# Patient Record
Sex: FEMALE | Race: WHITE | Employment: PART TIME | URBAN - METROPOLITAN AREA
[De-identification: names, ages, dates, MRNs, and addresses within clinical notes are randomized per-mention and may not be internally consistent; named-entity substitution may affect disease eponyms.]

---

## 2017-04-04 ENCOUNTER — OFFICE VISIT (OUTPATIENT)
Dept: PHYSICAL THERAPY | Age: 57
End: 2017-04-04
Attending: PHYSICIAN ASSISTANT
Payer: COMMERCIAL

## 2017-04-04 PROCEDURE — 97163 PT EVAL HIGH COMPLEX 45 MIN: CPT

## 2017-04-04 PROCEDURE — 97110 THERAPEUTIC EXERCISES: CPT

## 2017-04-04 NOTE — PROGRESS NOTES
LOWER EXTREMITY EVALUATION:   Referring Physician: Dr. Chevy Redmond  Diagnosis: gait dysfunction S/P L ankle ORIF 11/18/2016 due to trimalleolar fracture  Date of Service: 4/4/2017     PATIENT SUMMARY   Leandra Dalton is a 62year old y/o female who presents to increase functional use of LLE. See goals below. Precautions:  None  OBJECTIVE:   Gait: moderate antalgic gait favoring L pain. Noted increased L toe out, minimal heel strike and push off during stance phase of gait.    Palpation: tenderness B malleolar progress achieved in PT. Frequency / Duration: Patient will be seen for 2 x/week or a total of 12 visits over a 90 day period. Treatment will include: Manual Therapy; Therapeutic Exercises;  Neuromuscular Re-education; Gait Training; DL to SL strengthen

## 2017-04-06 ENCOUNTER — OFFICE VISIT (OUTPATIENT)
Dept: PHYSICAL THERAPY | Age: 57
End: 2017-04-06
Attending: PHYSICIAN ASSISTANT
Payer: COMMERCIAL

## 2017-04-06 PROCEDURE — 97110 THERAPEUTIC EXERCISES: CPT

## 2017-04-06 NOTE — PROGRESS NOTES
Dx: gait dysfunction S/P L ankle ORIF 11/18/2016 due to trimalleolar fracture          Authorized # of Visits:  12         Next MD visit: none scheduled  Fall Risk: standard         Precautions: n/a           Subjective: patient states that she is trying t exercises to increase strength and improve gait, gait training with cues to increase stance phase, improve heel strike and push off. Charges:  Thera Ex 3 ( 40 min)        Total Timed Treatment: 46 min  Total Treatment Time: 46 min

## 2017-04-10 ENCOUNTER — APPOINTMENT (OUTPATIENT)
Dept: PHYSICAL THERAPY | Age: 57
End: 2017-04-10
Attending: PHYSICIAN ASSISTANT
Payer: COMMERCIAL

## 2017-04-12 ENCOUNTER — OFFICE VISIT (OUTPATIENT)
Dept: PHYSICAL THERAPY | Age: 57
End: 2017-04-12
Attending: PHYSICIAN ASSISTANT
Payer: COMMERCIAL

## 2017-04-12 PROCEDURE — 97110 THERAPEUTIC EXERCISES: CPT

## 2017-04-12 NOTE — PROGRESS NOTES
Dx: gait dysfunction S/P L ankle ORIF 11/18/2016 due to trimalleolar fracture          Authorized # of Visits:  12         Next MD visit: none scheduled  Fall Risk: standard         Precautions: n/a           Subjective: patient states that she was in a lo Tandem walk with min A and with UE support 2 lengths of parallel bars  Tandem walk with min A and with UE support 2 lengths of parallel bars         Shuttle DL squats L 4 30 reps Shuttle DL squats increased to L5 30 reps        Ankle mobilization all curly

## 2017-04-17 ENCOUNTER — APPOINTMENT (OUTPATIENT)
Dept: PHYSICAL THERAPY | Age: 57
End: 2017-04-17
Payer: COMMERCIAL

## 2017-04-19 ENCOUNTER — OFFICE VISIT (OUTPATIENT)
Dept: PHYSICAL THERAPY | Age: 57
End: 2017-04-19
Attending: PHYSICIAN ASSISTANT
Payer: COMMERCIAL

## 2017-04-19 PROCEDURE — 97110 THERAPEUTIC EXERCISES: CPT

## 2017-04-19 NOTE — PROGRESS NOTES
Dx: gait dysfunction S/P L ankle ORIF 11/18/2016 due to trimalleolar fracture          Authorized # of Visits:  12         Next MD visit: none scheduled  Fall Risk: standard         Precautions: n/a           Subjective: patient states that her ankle is peter Lateral walk in parallel bars 5X Lateral walk in parallel bars 5X CP 10 min        Tandem walk with min A and with UE support 2 lengths of parallel bars  Tandem walk with min A and with UE support 2 lengths of parallel bars         Shuttle DL squats L

## 2017-04-24 ENCOUNTER — OFFICE VISIT (OUTPATIENT)
Dept: PHYSICAL THERAPY | Age: 57
End: 2017-04-24
Attending: PHYSICIAN ASSISTANT
Payer: COMMERCIAL

## 2017-04-24 PROCEDURE — 97110 THERAPEUTIC EXERCISES: CPT

## 2017-04-24 PROCEDURE — 97140 MANUAL THERAPY 1/> REGIONS: CPT

## 2017-04-24 NOTE — PROGRESS NOTES
Dx: gait dysfunction S/P L ankle ORIF 11/18/2016 due to trimalleolar fracture          Authorized # of Visits:  12         Next MD visit: none scheduled  Fall Risk: standard         Precautions: n/a           Subjective: patient states that her ankle is so gastroc stretching on incline 5 sec hold X 10 gastroc stretching on incline 5 sec hold X 10 gastroc stretching on incline 5 sec hold X 5 Anterior step up and over 2\" 10X      Tandem balance R and L 10 sec X 10 Tandem balance R and L 10 sec X 10 Ankle mo

## 2017-04-26 ENCOUNTER — APPOINTMENT (OUTPATIENT)
Dept: PHYSICAL THERAPY | Age: 57
End: 2017-04-26
Payer: COMMERCIAL

## 2017-05-02 ENCOUNTER — APPOINTMENT (OUTPATIENT)
Dept: PHYSICAL THERAPY | Age: 57
End: 2017-05-02
Attending: PHYSICIAN ASSISTANT
Payer: COMMERCIAL

## 2017-05-04 ENCOUNTER — APPOINTMENT (OUTPATIENT)
Dept: PHYSICAL THERAPY | Age: 57
End: 2017-05-04
Attending: PHYSICIAN ASSISTANT
Payer: COMMERCIAL

## 2017-05-04 ENCOUNTER — TELEPHONE (OUTPATIENT)
Dept: PHYSICAL THERAPY | Age: 57
End: 2017-05-04

## 2017-05-09 ENCOUNTER — APPOINTMENT (OUTPATIENT)
Dept: PHYSICAL THERAPY | Age: 57
End: 2017-05-09
Attending: PHYSICIAN ASSISTANT
Payer: COMMERCIAL

## 2017-05-11 ENCOUNTER — APPOINTMENT (OUTPATIENT)
Dept: PHYSICAL THERAPY | Age: 57
End: 2017-05-11
Attending: PHYSICIAN ASSISTANT
Payer: COMMERCIAL

## 2017-05-15 ENCOUNTER — APPOINTMENT (OUTPATIENT)
Dept: PHYSICAL THERAPY | Age: 57
End: 2017-05-15
Attending: PHYSICIAN ASSISTANT
Payer: COMMERCIAL

## 2017-05-17 ENCOUNTER — APPOINTMENT (OUTPATIENT)
Dept: PHYSICAL THERAPY | Age: 57
End: 2017-05-17
Attending: PHYSICIAN ASSISTANT
Payer: COMMERCIAL

## 2017-05-23 ENCOUNTER — APPOINTMENT (OUTPATIENT)
Dept: PHYSICAL THERAPY | Age: 57
End: 2017-05-23
Attending: PHYSICIAN ASSISTANT
Payer: COMMERCIAL

## 2017-08-26 ENCOUNTER — HOSPITAL ENCOUNTER (OUTPATIENT)
Dept: CT IMAGING | Facility: HOSPITAL | Age: 57
Discharge: HOME | End: 2017-08-26
Attending: FAMILY MEDICINE
Payer: COMMERCIAL

## 2017-08-26 DIAGNOSIS — K21.9 GASTROESOPHAGEAL REFLUX DISEASE: ICD-10-CM

## 2017-08-26 PROCEDURE — 71260 CT THORAX DX C+: CPT | Performed by: FAMILY MEDICINE

## 2017-08-26 PROCEDURE — 74177 CT ABD & PELVIS W/CONTRAST: CPT | Performed by: FAMILY MEDICINE

## 2017-11-28 ENCOUNTER — HOSPITAL ENCOUNTER (OUTPATIENT)
Dept: MAMMOGRAPHY | Age: 57
Discharge: HOME | End: 2017-11-28
Attending: FAMILY MEDICINE
Payer: COMMERCIAL

## 2017-11-28 DIAGNOSIS — Z12.39 ENCOUNTER FOR SCREENING FOR MALIGNANT NEOPLASM OF BREAST: ICD-10-CM

## 2017-11-28 PROCEDURE — 77067 SCR MAMMO BI INCL CAD: CPT | Performed by: FAMILY MEDICINE

## 2018-08-20 ENCOUNTER — LAB REQUISITION (OUTPATIENT)
Dept: LAB | Facility: HOSPITAL | Age: 58
End: 2018-08-20
Payer: COMMERCIAL

## 2018-08-20 DIAGNOSIS — R19.7 DIARRHEA: ICD-10-CM

## 2018-08-20 DIAGNOSIS — R12 HEARTBURN: ICD-10-CM

## 2018-08-20 DIAGNOSIS — K29.30 CHRONIC SUPERFICIAL GASTRITIS WITHOUT BLEEDING: ICD-10-CM

## 2018-08-20 DIAGNOSIS — R14.1 GAS PAIN: ICD-10-CM

## 2018-08-20 PROCEDURE — 88305 TISSUE EXAM BY PATHOLOGIST: CPT | Performed by: INTERNAL MEDICINE

## 2019-02-12 ENCOUNTER — HOSPITAL ENCOUNTER (OUTPATIENT)
Dept: MAMMOGRAPHY | Age: 59
Discharge: HOME | End: 2019-02-12
Attending: FAMILY MEDICINE
Payer: COMMERCIAL

## 2019-02-12 ENCOUNTER — HOSPITAL ENCOUNTER (OUTPATIENT)
Dept: CT IMAGING | Age: 59
Discharge: HOME | End: 2019-02-12
Attending: FAMILY MEDICINE
Payer: COMMERCIAL

## 2019-02-12 DIAGNOSIS — R91.1 LUNG NODULE: ICD-10-CM

## 2019-02-12 DIAGNOSIS — Z12.31 ENCOUNTER FOR SCREENING MAMMOGRAM FOR MALIGNANT NEOPLASM OF BREAST: ICD-10-CM

## 2019-02-12 DIAGNOSIS — E27.8 ADRENAL MASS (HCC): ICD-10-CM

## 2019-02-12 DIAGNOSIS — R93.5 ABNORMAL CT SCAN, PELVIS: ICD-10-CM

## 2019-02-12 LAB — CREAT SERPL-MCNC: 1 MG/DL (ref 0.55–1.02)

## 2019-02-12 PROCEDURE — 71260 CT THORAX DX C+: CPT | Performed by: FAMILY MEDICINE

## 2019-02-12 PROCEDURE — 82565 ASSAY OF CREATININE: CPT

## 2019-02-12 PROCEDURE — 74177 CT ABD & PELVIS W/CONTRAST: CPT | Performed by: FAMILY MEDICINE

## 2019-02-12 PROCEDURE — 77063 BREAST TOMOSYNTHESIS BI: CPT | Performed by: FAMILY MEDICINE

## 2019-02-12 PROCEDURE — 77067 SCR MAMMO BI INCL CAD: CPT | Performed by: FAMILY MEDICINE

## 2019-02-20 ENCOUNTER — APPOINTMENT (OUTPATIENT)
Dept: LAB | Age: 59
End: 2019-02-20
Attending: DERMATOLOGY
Payer: COMMERCIAL

## 2019-02-20 DIAGNOSIS — C44.01 BCC (BASAL CELL CARCINOMA), LIP: ICD-10-CM

## 2019-02-20 PROCEDURE — 88305 TISSUE EXAM BY PATHOLOGIST: CPT

## 2019-03-13 ENCOUNTER — APPOINTMENT (OUTPATIENT)
Dept: LAB | Age: 59
End: 2019-03-13
Attending: DERMATOLOGY
Payer: COMMERCIAL

## 2019-03-13 DIAGNOSIS — L72.11 PILAR CYST: ICD-10-CM

## 2019-03-13 DIAGNOSIS — R52 TENDERNESS: ICD-10-CM

## 2019-03-13 PROCEDURE — 88305 TISSUE EXAM BY PATHOLOGIST: CPT

## 2019-03-13 PROCEDURE — 88304 TISSUE EXAM BY PATHOLOGIST: CPT

## 2020-01-22 ENCOUNTER — HOSPITAL ENCOUNTER (OUTPATIENT)
Dept: CV DIAGNOSTICS | Facility: HOSPITAL | Age: 60
Discharge: HOME | End: 2020-01-22
Attending: FAMILY MEDICINE
Payer: COMMERCIAL

## 2020-01-22 DIAGNOSIS — R94.31 ABNORMAL EKG: ICD-10-CM

## 2020-01-22 PROCEDURE — 93226 XTRNL ECG REC<48 HR SCAN A/R: CPT | Performed by: FAMILY MEDICINE

## 2020-01-22 PROCEDURE — 93225 XTRNL ECG REC<48 HRS REC: CPT | Performed by: FAMILY MEDICINE

## 2020-01-22 PROCEDURE — 93227 XTRNL ECG REC<48 HR R&I: CPT | Performed by: FAMILY MEDICINE

## 2020-01-22 PROCEDURE — 93306 TTE W/DOPPLER COMPLETE: CPT | Performed by: FAMILY MEDICINE

## 2020-03-11 ENCOUNTER — HOSPITAL ENCOUNTER (OUTPATIENT)
Dept: CT IMAGING | Age: 60
Discharge: HOME | End: 2020-03-11
Attending: FAMILY MEDICINE
Payer: COMMERCIAL

## 2020-03-11 DIAGNOSIS — R91.1 LUNG NODULE: ICD-10-CM

## 2020-03-11 LAB — CREAT BLD-MCNC: 0.7 MG/DL (ref 0.55–1.02)

## 2020-03-11 PROCEDURE — 82565 ASSAY OF CREATININE: CPT

## 2020-03-11 PROCEDURE — 71260 CT THORAX DX C+: CPT | Performed by: FAMILY MEDICINE

## 2020-03-11 PROCEDURE — 74177 CT ABD & PELVIS W/CONTRAST: CPT | Performed by: FAMILY MEDICINE

## 2020-05-08 ENCOUNTER — HOSPITAL ENCOUNTER (OUTPATIENT)
Dept: MAMMOGRAPHY | Facility: HOSPITAL | Age: 60
Discharge: HOME | End: 2020-05-08
Attending: FAMILY MEDICINE
Payer: COMMERCIAL

## 2020-05-08 DIAGNOSIS — Z12.31 ENCOUNTER FOR SCREENING MAMMOGRAM FOR MALIGNANT NEOPLASM OF BREAST: ICD-10-CM

## 2020-05-08 PROCEDURE — 77067 SCR MAMMO BI INCL CAD: CPT | Performed by: FAMILY MEDICINE

## 2020-05-08 PROCEDURE — 77063 BREAST TOMOSYNTHESIS BI: CPT | Performed by: FAMILY MEDICINE

## 2020-05-21 ENCOUNTER — HOSPITAL ENCOUNTER (OUTPATIENT)
Dept: MAMMOGRAPHY | Facility: HOSPITAL | Age: 60
Discharge: HOME | End: 2020-05-21
Attending: FAMILY MEDICINE
Payer: COMMERCIAL

## 2020-05-21 DIAGNOSIS — R92.2 INCONCLUSIVE MAMMOGRAM: ICD-10-CM

## 2020-05-21 PROCEDURE — 76642 ULTRASOUND BREAST LIMITED: CPT | Performed by: FAMILY MEDICINE

## 2022-02-03 ENCOUNTER — ORDER TRANSCRIPTION (OUTPATIENT)
Dept: SLEEP CENTER | Age: 62
End: 2022-02-03

## 2022-02-07 ENCOUNTER — HOSPITAL ENCOUNTER (OUTPATIENT)
Dept: CV DIAGNOSTICS | Facility: HOSPITAL | Age: 62
Discharge: HOME | End: 2022-02-07
Attending: FAMILY MEDICINE
Payer: COMMERCIAL

## 2022-02-07 DIAGNOSIS — I48.91 ATRIAL FIBRILLATION, UNSPECIFIED TYPE (HCC): ICD-10-CM

## 2022-02-07 DIAGNOSIS — R00.2 PALPITATIONS: ICD-10-CM

## 2022-02-07 PROCEDURE — 93306 TTE W/DOPPLER COMPLETE: CPT | Performed by: FAMILY MEDICINE

## 2022-02-11 ENCOUNTER — HOSPITAL ENCOUNTER (OUTPATIENT)
Dept: MAMMOGRAPHY | Age: 62
Discharge: HOME | End: 2022-02-11
Attending: FAMILY MEDICINE
Payer: COMMERCIAL

## 2022-02-11 ENCOUNTER — HOSPITAL ENCOUNTER (OUTPATIENT)
Dept: CT IMAGING | Age: 62
Discharge: HOME | End: 2022-02-11
Attending: FAMILY MEDICINE
Payer: COMMERCIAL

## 2022-02-11 DIAGNOSIS — N60.09: ICD-10-CM

## 2022-02-11 DIAGNOSIS — R19.7 DIARRHEA, UNSPECIFIED TYPE: ICD-10-CM

## 2022-02-11 DIAGNOSIS — R10.84 ABDOMINAL PAIN, GENERALIZED: ICD-10-CM

## 2022-02-11 DIAGNOSIS — Z12.31 ENCOUNTER FOR SCREENING MAMMOGRAM FOR MALIGNANT NEOPLASM OF BREAST: ICD-10-CM

## 2022-02-11 LAB — CREAT BLD-MCNC: 0.8 MG/DL

## 2022-02-11 PROCEDURE — 74177 CT ABD & PELVIS W/CONTRAST: CPT | Performed by: FAMILY MEDICINE

## 2022-02-11 PROCEDURE — 82565 ASSAY OF CREATININE: CPT

## 2022-02-11 PROCEDURE — 77067 SCR MAMMO BI INCL CAD: CPT | Performed by: FAMILY MEDICINE

## 2022-02-11 PROCEDURE — 77063 BREAST TOMOSYNTHESIS BI: CPT | Performed by: FAMILY MEDICINE

## 2022-02-25 ENCOUNTER — HOSPITAL ENCOUNTER (OUTPATIENT)
Dept: MAMMOGRAPHY | Facility: HOSPITAL | Age: 62
Discharge: HOME | End: 2022-02-25
Attending: FAMILY MEDICINE
Payer: COMMERCIAL

## 2022-02-25 DIAGNOSIS — R92.2 INCONCLUSIVE MAMMOGRAPHY: ICD-10-CM

## 2022-02-25 PROCEDURE — 77061 BREAST TOMOSYNTHESIS UNI: CPT | Performed by: FAMILY MEDICINE

## 2022-02-25 PROCEDURE — 77065 DX MAMMO INCL CAD UNI: CPT | Performed by: FAMILY MEDICINE

## 2022-02-25 PROCEDURE — 76642 ULTRASOUND BREAST LIMITED: CPT | Performed by: FAMILY MEDICINE

## 2022-03-12 ENCOUNTER — OFFICE VISIT (OUTPATIENT)
Dept: SLEEP CENTER | Age: 62
End: 2022-03-12
Attending: INTERNAL MEDICINE
Payer: COMMERCIAL

## 2022-03-12 DIAGNOSIS — I48.91 A-FIB (HCC): ICD-10-CM

## 2022-03-12 DIAGNOSIS — R00.2 PALPITATIONS: ICD-10-CM

## 2022-03-12 PROCEDURE — 95810 POLYSOM 6/> YRS 4/> PARAM: CPT

## 2022-03-18 ENCOUNTER — HOSPITAL ENCOUNTER (OUTPATIENT)
Dept: CT IMAGING | Facility: HOSPITAL | Age: 62
Discharge: HOME | End: 2022-03-18
Attending: FAMILY MEDICINE
Payer: COMMERCIAL

## 2022-03-18 DIAGNOSIS — R91.1 LUNG NODULE: ICD-10-CM

## 2022-03-18 PROCEDURE — 71250 CT THORAX DX C-: CPT | Performed by: FAMILY MEDICINE

## 2022-03-31 ENCOUNTER — LAB ENCOUNTER (OUTPATIENT)
Dept: LAB | Age: 62
End: 2022-03-31
Attending: FAMILY MEDICINE
Payer: COMMERCIAL

## 2022-03-31 ENCOUNTER — HOSPITAL ENCOUNTER (OUTPATIENT)
Dept: MAMMOGRAPHY | Facility: HOSPITAL | Age: 62
Discharge: HOME | End: 2022-03-31
Attending: FAMILY MEDICINE
Payer: COMMERCIAL

## 2022-03-31 DIAGNOSIS — R92.1 BREAST CALCIFICATIONS: Primary | ICD-10-CM

## 2022-03-31 DIAGNOSIS — R92.1 BREAST CALCIFICATIONS: ICD-10-CM

## 2022-03-31 LAB
INR BLD: 0.97 (ref 0.8–1.2)
PROTHROMBIN TIME: 12.9 SECONDS (ref 11.6–14.8)

## 2022-03-31 PROCEDURE — 85610 PROTHROMBIN TIME: CPT

## 2022-03-31 PROCEDURE — 19081 BX BREAST 1ST LESION STRTCTC: CPT | Performed by: FAMILY MEDICINE

## 2022-03-31 PROCEDURE — 88305 TISSUE EXAM BY PATHOLOGIST: CPT | Performed by: FAMILY MEDICINE

## 2022-03-31 PROCEDURE — 36415 COLL VENOUS BLD VENIPUNCTURE: CPT

## 2022-04-01 NOTE — IMAGING NOTE
[facility] verified with Dr. Farooq Velarde. Pt notified of recent breast bx results. Pt denies any issues with biopsy site--mild tenderness, no drainage or  No redness. Pt informed will need 6 month f/u on left breast. Pt verbalized understanding and emotional support provided.
Patient requests all Lab, Cardiology, and Radiology Results on their Discharge Instructions

## 2022-08-08 ENCOUNTER — HOSPITAL ENCOUNTER (OUTPATIENT)
Dept: GENERAL RADIOLOGY | Facility: HOSPITAL | Age: 62
Discharge: HOME | End: 2022-08-08
Attending: FAMILY MEDICINE
Payer: COMMERCIAL

## 2022-08-08 DIAGNOSIS — M25.571 PAIN IN RIGHT ANKLE AND JOINTS OF RIGHT FOOT: ICD-10-CM
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
## 2022-08-08 DIAGNOSIS — M25.572 PAIN IN LEFT ANKLE AND JOINTS OF LEFT FOOT: ICD-10-CM

## 2022-08-08 PROCEDURE — 73610 X-RAY EXAM OF ANKLE: CPT | Performed by: FAMILY MEDICINE

## 2022-12-15 ENCOUNTER — ORDER TRANSCRIPTION (OUTPATIENT)
Dept: PHYSICAL THERAPY | Facility: HOSPITAL | Age: 62
End: 2022-12-15

## 2022-12-15 DIAGNOSIS — M79.10 MYALGIA: Primary | ICD-10-CM

## 2022-12-21 ENCOUNTER — HOSPITAL ENCOUNTER (OUTPATIENT)
Dept: MAMMOGRAPHY | Facility: HOSPITAL | Age: 62
Discharge: HOME | End: 2022-12-21
Attending: FAMILY MEDICINE
Payer: COMMERCIAL

## 2022-12-21 ENCOUNTER — HOSPITAL ENCOUNTER (OUTPATIENT)
Dept: CT IMAGING | Facility: HOSPITAL | Age: 62
Discharge: HOME | End: 2022-12-21
Attending: FAMILY MEDICINE
Payer: COMMERCIAL

## 2022-12-21 DIAGNOSIS — R14.0 ABDOMINAL BLOATING: ICD-10-CM

## 2022-12-21 DIAGNOSIS — R92.8 ABNORMAL MAMMOGRAM: ICD-10-CM

## 2022-12-21 LAB
CREAT BLD-MCNC: 0.9 MG/DL
GFR SERPLBLD BASED ON 1.73 SQ M-ARVRAT: 72 ML/MIN/1.73M2 (ref 60–?)

## 2022-12-21 PROCEDURE — 82565 ASSAY OF CREATININE: CPT

## 2022-12-21 PROCEDURE — 74177 CT ABD & PELVIS W/CONTRAST: CPT | Performed by: FAMILY MEDICINE

## 2023-04-25 ENCOUNTER — HOSPITAL ENCOUNTER (OUTPATIENT)
Dept: MAMMOGRAPHY | Facility: HOSPITAL | Age: 63
Discharge: HOME | End: 2023-04-25
Attending: FAMILY MEDICINE
Payer: COMMERCIAL

## 2023-04-25 DIAGNOSIS — R92.8 ABNORMAL MAMMOGRAM: ICD-10-CM

## 2023-04-25 PROCEDURE — 77062 BREAST TOMOSYNTHESIS BI: CPT | Performed by: FAMILY MEDICINE

## 2023-04-25 PROCEDURE — 76642 ULTRASOUND BREAST LIMITED: CPT | Performed by: FAMILY MEDICINE

## 2023-04-25 PROCEDURE — 77066 DX MAMMO INCL CAD BI: CPT | Performed by: FAMILY MEDICINE

## 2023-12-11 PROBLEM — D12.3 BENIGN NEOPLASM OF TRANSVERSE COLON: Status: ACTIVE | Noted: 2023-12-11

## 2023-12-11 PROBLEM — Z80.0 FAMILY HISTORY OF COLON CANCER: Status: ACTIVE | Noted: 2023-12-11

## 2023-12-11 PROBLEM — Z86.010 HISTORY OF ADENOMATOUS POLYP OF COLON: Status: ACTIVE | Noted: 2023-12-11

## 2023-12-11 PROBLEM — Z86.0101 HISTORY OF ADENOMATOUS POLYP OF COLON: Status: ACTIVE | Noted: 2023-12-11

## 2023-12-11 PROCEDURE — 88305 TISSUE EXAM BY PATHOLOGIST: CPT | Performed by: INTERNAL MEDICINE

## 2024-05-01 ENCOUNTER — HOSPITAL ENCOUNTER (OUTPATIENT)
Dept: CV DIAGNOSTICS | Age: 64
Discharge: HOME | End: 2024-05-01
Attending: FAMILY MEDICINE
Payer: COMMERCIAL

## 2024-05-01 DIAGNOSIS — I48.91 ATRIAL FIBRILLATION (HCC): ICD-10-CM

## 2024-05-01 DIAGNOSIS — R06.02 SHORTNESS OF BREATH ON EXERTION: ICD-10-CM

## 2024-05-01 PROCEDURE — 93017 CV STRESS TEST TRACING ONLY: CPT | Performed by: FAMILY MEDICINE

## 2024-05-01 PROCEDURE — 93350 STRESS TTE ONLY: CPT | Performed by: FAMILY MEDICINE

## 2024-06-12 ENCOUNTER — APPOINTMENT (OUTPATIENT)
Dept: RESPIRATORY THERAPY | Facility: HOSPITAL | Age: 64
End: 2024-06-12
Attending: FAMILY MEDICINE

## 2024-06-12 ENCOUNTER — HOSPITAL ENCOUNTER (OUTPATIENT)
Dept: GENERAL RADIOLOGY | Facility: HOSPITAL | Age: 64
Discharge: HOME | End: 2024-06-12
Attending: FAMILY MEDICINE
Payer: COMMERCIAL

## 2024-06-12 DIAGNOSIS — R06.09 DOE (DYSPNEA ON EXERTION): ICD-10-CM

## 2024-06-12 DIAGNOSIS — R06.09 DYSPNEA ON EXERTION: ICD-10-CM

## 2024-06-12 PROCEDURE — 94010 BREATHING CAPACITY TEST: CPT | Performed by: INTERNAL MEDICINE

## 2024-06-12 PROCEDURE — 71046 X-RAY EXAM CHEST 2 VIEWS: CPT | Performed by: FAMILY MEDICINE

## 2024-06-12 PROCEDURE — 94729 DIFFUSING CAPACITY: CPT | Performed by: INTERNAL MEDICINE

## 2024-06-12 PROCEDURE — 94726 PLETHYSMOGRAPHY LUNG VOLUMES: CPT | Performed by: INTERNAL MEDICINE

## 2024-06-24 NOTE — PROCEDURES
Pulmonary Function Test Report  Findings:  Prebronchodilator FEV1 is 1.71L, z-score -1.83.  Prebronchodilator FVC is 2.18L, z-score -1.92.                           FEV1/ FVC ratio is 78 %, z-score -0.06.   The flow-volume loop demonstrates a restrictive pattern.  The TLC is 4.40L, z-score -1.79. The residual volume 2.20L, z-score 0.31.    The diffusion capacity is 15.46, z-score -1.81 and 0.04 when corrected for alveolar volume.     Impression:  Spirometry shows a mild decrease in FEV1 and FVC without obstruction.  There is mild (z-score -1.65 to -2.5) restriction.  This can be seen in heart failure, fluid overload states, interstitial lung disease, thoracic spine/chest disorders, obesity as well as other clinical scenarios.  Further clinical correlation required.      Diffusion capacity is mild (z-score -1.65 to -2.5).  DLCO improves to normal when considering alveolar volume. This may represent a normal finding but can be seen in emphysema, interstitial lung disease, pulmonary vascular disease (such as pulmonary hypertension) and anemia. If not already performed, would suggest further evaluation as determined clinically        Disclaimer: This PFT has been interpreted based on Z-score/LLN/ULN criteria as described in ATS guidelines 2022.   Otto Kline MD  [facility]  Office: [phone]

## 2024-11-18 ENCOUNTER — HOSPITAL ENCOUNTER (OUTPATIENT)
Dept: CT IMAGING | Facility: HOSPITAL | Age: 64
Discharge: HOME | End: 2024-11-18
Attending: FAMILY MEDICINE
Payer: COMMERCIAL

## 2024-11-18 DIAGNOSIS — R94.2 ABNORMAL PFT: ICD-10-CM

## 2024-11-18 PROCEDURE — 71250 CT THORAX DX C-: CPT | Performed by: FAMILY MEDICINE

## 2024-12-09 ENCOUNTER — OFFICE VISIT (OUTPATIENT)
Age: 64
End: 2024-12-09
Payer: COMMERCIAL

## 2024-12-09 ENCOUNTER — TELEPHONE (OUTPATIENT)
Facility: CLINIC | Age: 64
End: 2024-12-09

## 2024-12-09 VITALS
HEART RATE: 59 BPM | OXYGEN SATURATION: 97 % | WEIGHT: 229 LBS | SYSTOLIC BLOOD PRESSURE: 130 MMHG | RESPIRATION RATE: 16 BRPM | BODY MASS INDEX: 36.8 KG/M2 | HEIGHT: 66 IN | DIASTOLIC BLOOD PRESSURE: 80 MMHG

## 2024-12-09 DIAGNOSIS — R05.3 CHRONIC COUGH: ICD-10-CM

## 2024-12-09 DIAGNOSIS — R91.8 MULTIPLE PULMONARY NODULES: Primary | ICD-10-CM

## 2024-12-09 DIAGNOSIS — R06.09 DOE (DYSPNEA ON EXERTION): ICD-10-CM

## 2024-12-09 DIAGNOSIS — J32.9 CHRONIC RHINOSINUSITIS: ICD-10-CM

## 2024-12-09 DIAGNOSIS — J98.4 RESTRICTIVE LUNG DISEASE: ICD-10-CM

## 2024-12-09 PROCEDURE — 99204 OFFICE O/P NEW MOD 45 MIN: CPT | Performed by: INTERNAL MEDICINE

## 2024-12-09 PROCEDURE — 3075F SYST BP GE 130 - 139MM HG: CPT | Performed by: INTERNAL MEDICINE

## 2024-12-09 PROCEDURE — 3008F BODY MASS INDEX DOCD: CPT | Performed by: INTERNAL MEDICINE

## 2024-12-09 PROCEDURE — 3079F DIAST BP 80-89 MM HG: CPT | Performed by: INTERNAL MEDICINE

## 2024-12-09 RX ORDER — BUDESONIDE AND FORMOTEROL FUMARATE DIHYDRATE 160; 4.5 UG/1; UG/1
1 AEROSOL RESPIRATORY (INHALATION) 2 TIMES DAILY
COMMUNITY

## 2024-12-09 RX ORDER — MUPIROCIN 20 MG/G
OINTMENT TOPICAL
COMMUNITY
Start: 2024-10-30

## 2024-12-09 RX ORDER — FLECAINIDE ACETATE 100 MG/1
100 TABLET ORAL 2 TIMES DAILY
COMMUNITY

## 2024-12-09 RX ORDER — METOPROLOL SUCCINATE 50 MG/1
50 TABLET, EXTENDED RELEASE ORAL 2 TIMES DAILY
COMMUNITY

## 2024-12-09 RX ORDER — ATORVASTATIN CALCIUM 10 MG/1
10 TABLET, FILM COATED ORAL DAILY
COMMUNITY

## 2024-12-09 RX ORDER — ZOLPIDEM TARTRATE 10 MG/1
10 TABLET ORAL DAILY
COMMUNITY

## 2024-12-09 RX ORDER — OLMESARTAN MEDOXOMIL AND HYDROCHLOROTHIAZIDE 20/12.5 20; 12.5 MG/1; MG/1
1 TABLET ORAL DAILY
COMMUNITY

## 2024-12-09 RX ORDER — BUSPIRONE HYDROCHLORIDE 5 MG/1
5 TABLET ORAL EVERY 8 HOURS
COMMUNITY

## 2024-12-09 RX ORDER — MONTELUKAST SODIUM 10 MG/1
10 TABLET ORAL DAILY
COMMUNITY

## 2024-12-09 RX ORDER — RIVAROXABAN 20 MG/1
TABLET, FILM COATED ORAL
COMMUNITY
Start: 2022-02-09

## 2024-12-09 RX ORDER — CLOBETASOL PROPIONATE 0.5 MG/G
OINTMENT TOPICAL
COMMUNITY
Start: 2024-10-30

## 2024-12-09 RX ORDER — FLUTICASONE FUROATE, UMECLIDINIUM BROMIDE AND VILANTEROL TRIFENATATE 200; 62.5; 25 UG/1; UG/1; UG/1
1 POWDER RESPIRATORY (INHALATION) DAILY
Qty: 1 EACH | Refills: 3 | Status: SHIPPED | OUTPATIENT
Start: 2024-12-09

## 2024-12-09 RX ORDER — HYDROCODONE BITARTRATE AND ACETAMINOPHEN 5; 325 MG/1; MG/1
2 TABLET ORAL EVERY 6 HOURS
COMMUNITY
Start: 2024-11-29

## 2024-12-09 NOTE — PATIENT INSTRUCTIONS
Start using nasal rinses (neilmed rinse, netipot or similar) with distilled water at least once a day but can use twice a day if needed. After using the nasal rinse, then use a nasal steroid such as flonase, nasocort, nasonex or similar medication. You can get generic nasal steroids.  Use this for at least two weeks to assess for any improvement.  You can use plain guaifenesin (brand name is mucinex or robitussin) for the cough as well.  Stop the symbicort.  Start trelegy inhaler - one puff once a day. Rinse your mouth out after using inhaler  Be sure to price inhalers with goal for medications to be less than $50 per month  Continue to use albuterol 2 puffs every 6 hours as needed for your breathing or cough  We will set up Nodify testing for you- hopefully this will be done in the next 1-2 weeks  Based on the Nodify test, we may repeat a CT chest scan in a few months  I will send a referral for you to pulmonary rehab to [facility] - they should contact you within a week  Consider the weight loss clinic - Call [phone] or go online: [email or web address]

## 2024-12-09 NOTE — TELEPHONE ENCOUNTER
Per Dr. Bledsoe:  Send referral to [facility] for pulm rehab.  Pulmonary Rehab form for [facility] and placed on Dr. Bledsoe's desk for his signature.

## 2024-12-09 NOTE — H&P
[facility] Pulmonary Consult Note    History of Present Illness:  Veronika Nash is a 64 year old female former smoker (quit 2023, 15+ pack years) with significant PMH of afib, asthma, GERD, HTN, HLD and POLO who presents today for evaluation of dyspnea and lung nodules. She explains she quit smoking in early 2023 and since has gained 50# or more. Over the past year she has felt more dyspneic than usual and has had limitation in her function. She followed up with her PCP Dr. Garrido and underwent PFTs showing mild restriction and a follow up CT chest showing a lung nodule leading to her evaluation here. She does report chronic cough with throat clearing. Feels nasal congestion/drainage.   Has occasional wheeze and reports hx of asthma in the past.   She was recently given symbicort a week ago and not sure it is helping.  Has been using albuterol without much change in sx.   Following with cardiology regarding afib and thinks her afib is worsening    Past Medical History:   Past Medical History:    Abdominal distention    Abdominal hernia    Abdominal pain    Acid reflux    Anesthesia complication    bradycardia    Anxiety    Arthritis    Asthma (HCC)    Atypical mole    Back pain    Belching    Bloating    Blurred vision    Body piercing    Change in hair    Chest pain    Decorative tattoo    Diarrhea, unspecified    Esophageal reflux    Essential hypertension    Fatigue    Heart palpitations    Heartburn    Hemorrhoids    High blood pressure    High cholesterol    History of depression    Hypercholesteremia    Hyperlipidemia    Hypertension    Indigestion    Irregular bowel habits    Itch of skin    Night sweats    POLO (obstructive sleep apnea)    AHI-7    Osteoarthritis    Pain in joints    Rash    Sleep disturbance    Stomach ulcer    Stress    Uncomfortable fullness after meals    Wears glasses    Weight gain      Past Surgical History:   Past Surgical History:   Procedure Laterality Date    Appendectomy       Appendectomy      Back surgery      L4-5 Laminectomy    Cholecystectomy      Colonoscopy      Cyst aspiration left      Cyst aspiration right      [place] biopsy stereo nodule 1 site left (cpt=19081) Left 2022    bn    Needle biopsy left  2022    Columnar change    Other surgical history      Exploratory surgery from internal bleeding at age 16 for benign tumor       Family Medical History:   Family History   Problem Relation Age of Onset    Breast Cancer Mother 52    Alcohol and Other Disorders Associated Mother     Heart Attack Mother     Colon Cancer Father     Colon Polyps Father     Diabetes Brother     Colon Polyps Brother         Social History:   Social History     Socioeconomic History    Marital status:      Spouse name: Not on file    Number of children: Not on file    Years of education: Not on file    Highest education level: Not on file   Occupational History    Not on file   Tobacco Use    Smoking status: Former     Current packs/day: 0.00     Average packs/day: 0.5 packs/day for 28.0 years (14.0 ttl pk-yrs)     Types: Cigarettes     Start date:      Quit date:      Years since quittin.9    Smokeless tobacco: Never   Substance and Sexual Activity    Alcohol use: Yes     Alcohol/week: 9.0 standard drinks of alcohol     Types: 6 Glasses of wine, 3 Shots of liquor per week    Drug use: No    Sexual activity: Not on file   Other Topics Concern    Not on file   Social History Narrative    Not on file     Social Drivers of Health     Financial Resource Strain: Not on file   Food Insecurity: Not on file   Transportation Needs: Not on file   Physical Activity: Not on file   Stress: Not on file   Social Connections: Not on file   Housing Stability: Not on file        Allergies: Bactrim [sulfamethoxazole w/trimethoprim, co-trimoxazole] and Sulfa antibiotics     Medications:   Current Outpatient Medications   Medication Sig Dispense Refill    atorvastatin 10 MG Oral Tab Take 1 tablet (10  mg total) by mouth daily.      Budesonide-Formoterol Fumarate 160-4.5 MCG/ACT Inhalation Aerosol Inhale 1 puff into the lungs 2 (two) times daily.      busPIRone 5 MG Oral Tab Take 1 tablet (5 mg total) by mouth every 8 (eight) hours.      clobetasol 0.05 % External Ointment Apply to affected area 2 TIMES DAILY on the elbow FOR 4 weeks max. Avoid face, armpits, groin.      HYDROcodone-acetaminophen 5-325 MG Oral Tab Take 2 tablets by mouth every 6 (six) hours.      metoprolol succinate ER 50 MG Oral Tablet 24 Hr Take 1 tablet (50 mg total) by mouth 2 (two) times daily.      montelukast 10 MG Oral Tab Take 1 tablet (10 mg total) by mouth daily.      mupirocin 2 % External Ointment APPLY TO AFFECTED AREA UNDER THE BREASTS AND ON THE GROIN 3 TIMES DAILY FOR 1 WEEK ON BOILS      Olmesartan Medoxomil-HCTZ 20-12.5 MG Oral Tab Take 1 tablet by mouth daily.      XARELTO 20 MG Oral Tab Xarelto 20 Mg Tab [facility], [RxNorm: 6028172]      zolpidem 10 MG Oral Tab Take 1 tablet (10 mg total) by mouth daily.      fluticasone-umeclidin-vilant (TRELEGY ELLIPTA) 200-62.5-25 MCG/ACT Inhalation Aerosol Powder, Breath Activated Inhale 1 puff into the lungs daily. 1 each 3    Multiple Vitamin (MULTI-VITAMIN DAILY) Oral Tab Take by mouth daily.      Vitamin C 500 MG Oral Tab Take 1 tablet (500 mg total) by mouth 2 (two) times daily.      Cholecalciferol (VITAMIN D) 2000 units Oral Cap Take 1 capsule (2,000 Units total) by mouth daily.      CINNAMON OR Take 1,000 mg by mouth daily.      gabapentin 300 MG Oral Cap Take 1 capsule (300 mg total) by mouth 2 (two) times daily.      Fenofibrate 145 MG Oral Tab Take 1 tablet (145 mg total) by mouth daily.      Pantoprazole Sodium 40 MG Oral Tab EC Take 1 tablet (40 mg total) by mouth every morning before breakfast.      LORazepam 1 MG Oral Tab Take 1 tablet (1 mg total) by mouth every 4 (four) hours as needed for Anxiety.      escitalopram 20 MG Oral Tab Take 1 tablet (20 mg total) by mouth daily.       Albuterol Sulfate (PROAIR HFA IN) Inhale into the lungs as needed.        flecainide 100 MG Oral Tab Take 1 tablet (100 mg total) by mouth 2 (two) times daily.      PEG 3350-KCl-Na Bicarb-NaCl 420 g Oral Recon Soln Take as directed by physician 4000 mL 0    PEG 3350-KCl-NaBcb-NaCl-NaSulf (PEG 3350/ELECTROLYTES) 240 g Oral Recon Soln Take as directed by physician. 4000 mL 0    atenolol 50 MG Oral Tab Take 1 tablet (50 mg total) by mouth 2 (two) times daily.         Review of Systems: Review of Systems   Constitutional: Negative.    HENT:  Positive for congestion and postnasal drip.    Respiratory:  Positive for cough, shortness of breath and wheezing.    Gastrointestinal: Negative.    All other systems reviewed and are negative.      Physical Exam:  /80 (BP Location: Left arm, Patient Position: Sitting, Cuff Size: adult)   Pulse 59   Resp 16   Ht 5' 6\" (1.676 m)   Wt 229 lb (103.9 kg)   SpO2 97%   BMI 36.96 kg/m²      Constitutional: alert, cooperative. No acute distress.  HEENT: Head NC/AT. +nasal mucus and erythema.   Cardio: Regular rate and rhythm. Normal S1 and S2. No murmurs.   Respiratory: Thorax symmetrical with no labored breathing. Clear to ausculation bilaterally with symmetrical breath sounds. No wheezing, rhonchi, rales, or crackles.   GI: NABS. Abd soft, non-tender.  Extremities: No clubbing or cyanosis. No BLE edema.    Neurologic: A&Ox3. No gross motor deficits.  Skin: Warm, dry  Psych: Calm, cooperative. Pleasant affect.    Results:  Personally reviewed  No CBC panel on file.     Last eGFR was 72 on 12/21/2022.     CT CHEST HI RESOLUTION (CPT=71250)    Result Date: 11/18/2024  CONCLUSION:  1. New ground-glass opacity in the right upper lobe.  Fleischner guidelines recommend CT at 6-12 months, then if persistent, CT every 2 years until 5 years. 2. Stable left upper lobe ground-glass opacity. 3. No new interstitial lung disease, bronchiectasis or cystic lung change. 4. Details as above.   Continued clinical correlation and follow-up recommended.   NOTE:  This high-resolution chest CT examination is designed for evaluation of interstitial lung disease, bronchiectasis and emphysema and is therefore not the examination of choice for adenopathy or other more general indications.    LOCATION:  [facility]    Dictated by (CST): Rj Dsouza MD on 11/18/2024 at 3:51 PM     Finalized by (CST): Rj Dsouza MD on 11/18/2024 at 4:00 PM         Assessment/Plan:  #1. Lung nodules  Previously noted nodules on previous CT imaging with largest in TERESA   2019 CT chest with TERESA 11-12mm GGO.  3-4mm LLL nodule  2020 CT chest stable  2022 CT chest stable  2024 CT chest with previous nodules stable, however with new GGO RUL 8-9mm. No obvious parenchymal disease. No LAD  We reviewed his imaging in detail including discussion of differential diagnoses and management. We reviewed options at this time including considering repeat CT chest in 3-6months vs nodify testing vs PET/CT vs biopsies.  We discussed the pros/cons of each.we will obtain nodify testing first and if low risk then plan follow up CT chest in 3-6months.  If nodify testing is positive, then will need to consider either PET/cT vs biopsies    #2. Restrictive lung disease  Noted mild restriction on PFTs on workup for dyspnea  Her PFTs were reviewed personally and given her worsening hx of weight gain of 50# and reduced ERV to 24%, this is most consistent with weight/obesity related restriction  Advised weight loss - will give info for weight loss clinic  Also discussed she could consider pulm rehab at [facility] if she wants - she is agreeable    #3. dyspnea  Ongoing for the past year  Most likely related to her weight gain of 50# over the past year  PFTs -her PFTs were reviewed personally and given her worsening hx of weight gain of 50# and reduced ERV to 24%, this is most consistent with weight/obesity related restriction  She reports hx of asthma but this is not  apparent on her exam or testing - she is on symbicort 160 and albuterol. Can trial on trelegy daily and assess if any better  Advised weight loss - will give info for weight loss clinic  Also discussed she could consider pulm rehab at [facility] if she wants - she is agreeable    #4. Chronic cough  Most likely postnasal  Start nasal rinses/sprays. May need to see ENT    #5. Chronic rhinosinusitis  Most likely postnasal  Start nasal rinses/sprays. May need to see ENT    Jed Bledsoe MD  12/9/2024

## 2025-01-10 ENCOUNTER — TELEPHONE (OUTPATIENT)
Facility: CLINIC | Age: 65
End: 2025-01-10

## 2025-01-10 NOTE — TELEPHONE ENCOUNTER
Left message for patient to call regarding setting up her Notify test. She can call them at [phone] Option 2 to set up the home blood draw

## 2025-01-27 ENCOUNTER — HOSPITAL ENCOUNTER (INPATIENT)
Facility: HOSPITAL | Age: 65
LOS: 3 days | Discharge: HOME | End: 2025-01-30
Attending: HOSPITALIST | Admitting: HOSPITALIST
Payer: MEDICARE

## 2025-01-27 DIAGNOSIS — Z51.81 VISIT FOR MONITORING TIKOSYN THERAPY: Primary | ICD-10-CM

## 2025-01-27 DIAGNOSIS — Z79.899 VISIT FOR MONITORING TIKOSYN THERAPY: Primary | ICD-10-CM

## 2025-01-27 LAB
ALBUMIN SERPL-MCNC: 4.6 G/DL (ref 3.2–4.8)
ALBUMIN/GLOB SERPL: 1.8 {RATIO} (ref 1–2)
ALP LIVER SERPL-CCNC: 64 U/L
ALT SERPL-CCNC: 50 U/L
ANION GAP SERPL CALC-SCNC: 11 MMOL/L (ref 0–18)
AST SERPL-CCNC: 78 U/L (ref ?–34)
ATRIAL RATE: 147 BPM
ATRIAL RATE: 68 BPM
BASOPHILS # BLD AUTO: 0.07 X10(3) UL (ref 0–0.2)
BASOPHILS NFR BLD AUTO: 0.9 %
BILIRUB SERPL-MCNC: 0.7 MG/DL (ref 0.2–1.1)
BUN BLD-MCNC: 18 MG/DL (ref 9–23)
CALCIUM BLD-MCNC: 9.7 MG/DL (ref 8.7–10.6)
CHLORIDE SERPL-SCNC: 104 MMOL/L (ref 98–112)
CO2 SERPL-SCNC: 27 MMOL/L (ref 21–32)
CREAT BLD-MCNC: 1.07 MG/DL
EGFRCR SERPLBLD CKD-EPI 2021: 58 ML/MIN/1.73M2 (ref 60–?)
EOSINOPHIL # BLD AUTO: 0.27 X10(3) UL (ref 0–0.7)
EOSINOPHIL NFR BLD AUTO: 3.5 %
ERYTHROCYTE [DISTWIDTH] IN BLOOD BY AUTOMATED COUNT: 13.4 %
GLOBULIN PLAS-MCNC: 2.6 G/DL (ref 2–3.5)
GLUCOSE BLD-MCNC: 114 MG/DL (ref 70–99)
HCT VFR BLD AUTO: 43.6 %
HGB BLD-MCNC: 14.6 G/DL
IMM GRANULOCYTES # BLD AUTO: 0.02 X10(3) UL (ref 0–1)
IMM GRANULOCYTES NFR BLD: 0.3 %
LYMPHOCYTES # BLD AUTO: 2.93 X10(3) UL (ref 1–4)
LYMPHOCYTES NFR BLD AUTO: 38.1 %
MAGNESIUM SERPL-MCNC: 1.2 MG/DL (ref 1.6–2.6)
MAGNESIUM SERPL-MCNC: 1.3 MG/DL (ref 1.6–2.6)
MCH RBC QN AUTO: 32 PG (ref 26–34)
MCHC RBC AUTO-ENTMCNC: 33.5 G/DL (ref 31–37)
MCV RBC AUTO: 95.6 FL
MONOCYTES # BLD AUTO: 0.53 X10(3) UL (ref 0.1–1)
MONOCYTES NFR BLD AUTO: 6.9 %
NEUTROPHILS # BLD AUTO: 3.88 X10 (3) UL (ref 1.5–7.7)
NEUTROPHILS # BLD AUTO: 3.88 X10(3) UL (ref 1.5–7.7)
NEUTROPHILS NFR BLD AUTO: 50.3 %
OSMOLALITY SERPL CALC.SUM OF ELEC: 297 MOSM/KG (ref 275–295)
P-R INTERVAL: 180 MS
PLATELET # BLD AUTO: 285 10(3)UL (ref 150–450)
POTASSIUM SERPL-SCNC: 4.3 MMOL/L (ref 3.5–5.1)
PROT SERPL-MCNC: 7.2 G/DL (ref 5.7–8.2)
Q-T INTERVAL: 350 MS
Q-T INTERVAL: 400 MS
QRS DURATION: 90 MS
QRS DURATION: 92 MS
QTC CALCULATION (BEZET): 425 MS
QTC CALCULATION (BEZET): 480 MS
R AXIS: -47 DEGREES
R AXIS: -56 DEGREES
RBC # BLD AUTO: 4.56 X10(6)UL
SODIUM SERPL-SCNC: 142 MMOL/L (ref 136–145)
T AXIS: -31 DEGREES
T AXIS: 80 DEGREES
VENTRICULAR RATE: 113 BPM
VENTRICULAR RATE: 68 BPM
WBC # BLD AUTO: 7.7 X10(3) UL (ref 4–11)

## 2025-01-27 PROCEDURE — 99223 1ST HOSP IP/OBS HIGH 75: CPT | Performed by: HOSPITALIST

## 2025-01-27 RX ORDER — HYDROCODONE BITARTRATE AND ACETAMINOPHEN 5; 325 MG/1; MG/1
2 TABLET ORAL EVERY 6 HOURS
Status: DISCONTINUED | OUTPATIENT
Start: 2025-01-27 | End: 2025-01-27

## 2025-01-27 RX ORDER — FENOFIBRATE 134 MG/1
134 CAPSULE ORAL NIGHTLY
Status: DISCONTINUED | OUTPATIENT
Start: 2025-01-27 | End: 2025-01-30

## 2025-01-27 RX ORDER — BISACODYL 10 MG
10 SUPPOSITORY, RECTAL RECTAL
Status: DISCONTINUED | OUTPATIENT
Start: 2025-01-27 | End: 2025-01-30

## 2025-01-27 RX ORDER — DOXYCYCLINE 100 MG/1
100 CAPSULE ORAL 2 TIMES DAILY
COMMUNITY

## 2025-01-27 RX ORDER — SODIUM PHOSPHATE, DIBASIC AND SODIUM PHOSPHATE, MONOBASIC 7; 19 G/230ML; G/230ML
1 ENEMA RECTAL ONCE AS NEEDED
Status: DISCONTINUED | OUTPATIENT
Start: 2025-01-27 | End: 2025-01-30

## 2025-01-27 RX ORDER — ATORVASTATIN CALCIUM 10 MG/1
10 TABLET, FILM COATED ORAL DAILY
Status: DISCONTINUED | OUTPATIENT
Start: 2025-01-27 | End: 2025-01-30

## 2025-01-27 RX ORDER — MULTIVIT-MIN/IRON/FOLIC ACID/K 18-600-40
2000 CAPSULE ORAL DAILY
Status: DISCONTINUED | OUTPATIENT
Start: 2025-01-27 | End: 2025-01-27

## 2025-01-27 RX ORDER — GABAPENTIN 300 MG/1
300 CAPSULE ORAL 2 TIMES DAILY
Status: DISCONTINUED | OUTPATIENT
Start: 2025-01-27 | End: 2025-01-30

## 2025-01-27 RX ORDER — ESCITALOPRAM OXALATE 10 MG/1
10 TABLET ORAL DAILY
Status: DISCONTINUED | OUTPATIENT
Start: 2025-01-28 | End: 2025-01-27

## 2025-01-27 RX ORDER — HYDROCODONE BITARTRATE AND ACETAMINOPHEN 5; 325 MG/1; MG/1
2 TABLET ORAL EVERY 6 HOURS PRN
Status: DISCONTINUED | OUTPATIENT
Start: 2025-01-27 | End: 2025-01-30

## 2025-01-27 RX ORDER — ACETAMINOPHEN 500 MG
1000 TABLET ORAL EVERY 4 HOURS PRN
Status: DISCONTINUED | OUTPATIENT
Start: 2025-01-27 | End: 2025-01-30

## 2025-01-27 RX ORDER — BUSPIRONE HYDROCHLORIDE 5 MG/1
5 TABLET ORAL 2 TIMES DAILY
Status: DISCONTINUED | OUTPATIENT
Start: 2025-01-27 | End: 2025-01-30

## 2025-01-27 RX ORDER — SENNOSIDES 8.6 MG
17.2 TABLET ORAL NIGHTLY PRN
Status: DISCONTINUED | OUTPATIENT
Start: 2025-01-27 | End: 2025-01-30

## 2025-01-27 RX ORDER — BUSPIRONE HYDROCHLORIDE 5 MG/1
5 TABLET ORAL EVERY 8 HOURS
Status: DISCONTINUED | OUTPATIENT
Start: 2025-01-27 | End: 2025-01-27

## 2025-01-27 RX ORDER — MAGNESIUM OXIDE 400 MG/1
400 TABLET ORAL DAILY
COMMUNITY
End: 2025-01-30

## 2025-01-27 RX ORDER — ZOLPIDEM TARTRATE 10 MG/1
10 TABLET ORAL NIGHTLY
Status: DISCONTINUED | OUTPATIENT
Start: 2025-01-27 | End: 2025-01-30

## 2025-01-27 RX ORDER — LOSARTAN POTASSIUM 25 MG/1
25 TABLET ORAL DAILY
Status: DISCONTINUED | OUTPATIENT
Start: 2025-01-27 | End: 2025-01-30

## 2025-01-27 RX ORDER — LORAZEPAM 1 MG/1
1 TABLET ORAL EVERY 4 HOURS PRN
Status: DISCONTINUED | OUTPATIENT
Start: 2025-01-27 | End: 2025-01-30

## 2025-01-27 RX ORDER — METOPROLOL SUCCINATE 50 MG/1
50 TABLET, EXTENDED RELEASE ORAL
Status: DISCONTINUED | OUTPATIENT
Start: 2025-01-27 | End: 2025-01-27

## 2025-01-27 RX ORDER — OLMESARTAN MEDOXOMIL AND HYDROCHLOROTHIAZIDE 20/12.5 20; 12.5 MG/1; MG/1
1 TABLET ORAL DAILY
Status: DISCONTINUED | OUTPATIENT
Start: 2025-01-27 | End: 2025-01-27 | Stop reason: ALTCHOICE

## 2025-01-27 RX ORDER — ESCITALOPRAM OXALATE 20 MG/1
20 TABLET ORAL DAILY
Status: DISCONTINUED | OUTPATIENT
Start: 2025-01-27 | End: 2025-01-27

## 2025-01-27 RX ORDER — MAGNESIUM OXIDE 400 MG/1
800 TABLET ORAL 2 TIMES DAILY WITH MEALS
Status: DISCONTINUED | OUTPATIENT
Start: 2025-01-27 | End: 2025-01-30

## 2025-01-27 RX ORDER — METOCLOPRAMIDE HYDROCHLORIDE 5 MG/ML
10 INJECTION INTRAMUSCULAR; INTRAVENOUS EVERY 8 HOURS PRN
Status: DISCONTINUED | OUTPATIENT
Start: 2025-01-27 | End: 2025-01-28

## 2025-01-27 RX ORDER — FLUTICASONE PROPIONATE AND SALMETEROL 250; 50 UG/1; UG/1
1 POWDER RESPIRATORY (INHALATION) 2 TIMES DAILY
Status: DISCONTINUED | OUTPATIENT
Start: 2025-01-27 | End: 2025-01-27

## 2025-01-27 RX ORDER — MAGNESIUM SULFATE HEPTAHYDRATE 40 MG/ML
2 INJECTION, SOLUTION INTRAVENOUS ONCE
Status: COMPLETED | OUTPATIENT
Start: 2025-01-27 | End: 2025-01-27

## 2025-01-27 RX ORDER — MONTELUKAST SODIUM 10 MG/1
10 TABLET ORAL
Status: DISCONTINUED | OUTPATIENT
Start: 2025-01-27 | End: 2025-01-30

## 2025-01-27 RX ORDER — MULTIVITAMIN
TABLET ORAL DAILY
Status: DISCONTINUED | OUTPATIENT
Start: 2025-01-27 | End: 2025-01-27

## 2025-01-27 RX ORDER — PANTOPRAZOLE SODIUM 40 MG/1
40 TABLET, DELAYED RELEASE ORAL
Status: DISCONTINUED | OUTPATIENT
Start: 2025-01-27 | End: 2025-01-30

## 2025-01-27 RX ORDER — ONDANSETRON 2 MG/ML
4 INJECTION INTRAMUSCULAR; INTRAVENOUS EVERY 6 HOURS PRN
Status: DISCONTINUED | OUTPATIENT
Start: 2025-01-27 | End: 2025-01-30

## 2025-01-27 RX ORDER — MAGNESIUM OXIDE 400 MG/1
800 TABLET ORAL 2 TIMES DAILY WITH MEALS
Status: DISCONTINUED | OUTPATIENT
Start: 2025-01-27 | End: 2025-01-27

## 2025-01-27 RX ORDER — FLUTICASONE PROPIONATE AND SALMETEROL 500; 50 UG/1; UG/1
1 POWDER RESPIRATORY (INHALATION)
Status: DISCONTINUED | OUTPATIENT
Start: 2025-01-27 | End: 2025-01-30

## 2025-01-27 RX ORDER — MAGNESIUM OXIDE 400 MG/1
800 TABLET ORAL ONCE
Status: DISCONTINUED | OUTPATIENT
Start: 2025-01-27 | End: 2025-01-27

## 2025-01-27 RX ORDER — POLYETHYLENE GLYCOL 3350 17 G/17G
17 POWDER, FOR SOLUTION ORAL DAILY PRN
Status: DISCONTINUED | OUTPATIENT
Start: 2025-01-27 | End: 2025-01-30

## 2025-01-27 RX ORDER — ESCITALOPRAM OXALATE 10 MG/1
10 TABLET ORAL DAILY
Status: DISCONTINUED | OUTPATIENT
Start: 2025-01-27 | End: 2025-01-30

## 2025-01-27 RX ORDER — ASCORBIC ACID 500 MG
500 TABLET ORAL 2 TIMES DAILY
Status: DISCONTINUED | OUTPATIENT
Start: 2025-01-27 | End: 2025-01-27

## 2025-01-27 RX ORDER — FLECAINIDE ACETATE 50 MG/1
100 TABLET ORAL 2 TIMES DAILY
Status: DISCONTINUED | OUTPATIENT
Start: 2025-01-27 | End: 2025-01-27

## 2025-01-27 RX ORDER — DOFETILIDE 0.25 MG/1
250 CAPSULE ORAL EVERY 12 HOURS
Status: DISCONTINUED | OUTPATIENT
Start: 2025-01-27 | End: 2025-01-29

## 2025-01-27 RX ORDER — ECHINACEA PURPUREA EXTRACT 125 MG
1 TABLET ORAL
Status: DISCONTINUED | OUTPATIENT
Start: 2025-01-27 | End: 2025-01-30

## 2025-01-27 NOTE — PLAN OF CARE
Patient arrived to unit around approx 0900 this AM. Oriented to unit. Admission navigator complete. PTA medication list reviewed and completed. Patient alert and oriented x4. Spo2 maintained on RA. Afib on tele, HR maintained between 90's-110's. Tikosyn loading per cardiology. EKG completed per order. Patient continent of bowel and bladder. Denies pain at this time. 2 person skin check completed with THANG Hernandez. Patient is up with standby assist in the room. Updated on POC. Needs met.     Approx 1646: This RN received call from PRATIBHA SPRINGER regarding magnesium orders. Per PRATIBHA SPRINGER, okay to receive ordered IV dose of magnesium along with PO dose of magnesium due at 1800. This RN clarified with PRATIBHA SPRINGER regarding magnesium level recheck prior to first dose of Tikosyn tonight, 1/27. Per [facility] G[facility], recheck in AM.     Problem: Patient/Family Goals  Goal: Patient/Family Long Term Goal  Description: Patient's Long Term Goal: Stay healthy when I discharge     Interventions:  - Attend follow up appointments as needed  - Follow medication regimen   - See additional Care Plan goals for specific interventions  Outcome: Progressing  Goal: Patient/Family Short Term Goal  Description: Patient's Short Term Goal: To feel better    Interventions:   - Tele monitoring  - Monitor labs  - Tikosyn loading   - Pain management   - See additional Care Plan goals for specific interventions  Outcome: Progressing     Problem: CARDIOVASCULAR - ADULT  Goal: Maintains optimal cardiac output and hemodynamic stability  Description: INTERVENTIONS:  - Monitor vital signs, rhythm, and trends  - Monitor for bleeding, hypotension and signs of decreased cardiac output  - Evaluate effectiveness of vasoactive medications to optimize hemodynamic stability  - Monitor arterial and/or venous puncture sites for bleeding and/or hematoma  - Assess quality of pulses, skin color and temperature  - Assess for signs of decreased coronary artery perfusion - ex. Angina  -  Evaluate fluid balance, assess for edema, trend weights  Outcome: Progressing  Goal: Absence of cardiac arrhythmias or at baseline  Description: INTERVENTIONS:  - Continuous cardiac monitoring, monitor vital signs, obtain 12 lead EKG if indicated  - Evaluate effectiveness of antiarrhythmic and heart rate control medications as ordered  - Initiate emergency measures for life threatening arrhythmias  - Monitor electrolytes and administer replacement therapy as ordered  Outcome: Progressing     Problem: RESPIRATORY - ADULT  Goal: Achieves optimal ventilation and oxygenation  Description: INTERVENTIONS:  - Assess for changes in respiratory status  - Assess for changes in mentation and behavior  - Position to facilitate oxygenation and minimize respiratory effort  - Oxygen supplementation based on oxygen saturation or ABGs  - Provide Smoking Cessation handout, if applicable  - Encourage broncho-pulmonary hygiene including cough, deep breathe, Incentive Spirometry  - Assess the need for suctioning and perform as needed  - Assess and instruct to report SOB or any respiratory difficulty  - Respiratory Therapy support as indicated  - Manage/alleviate anxiety  - Monitor for signs/symptoms of CO2 retention  Outcome: Progressing

## 2025-01-27 NOTE — PROGRESS NOTES
01/27/25 0907 01/27/25 0908 01/27/25 0913   Vital Signs   Pulse (!) 130 (!) 128 (!) 132   Heart Rate Source Monitor Monitor Monitor   Resp 20 20 26   Respiratory Quality Normal Normal Normal   /84 (!) 128/96 128/89   MAP (mmHg) 92 (!) 105 100   BP Location Left arm Left arm Left arm   BP Method Automatic Automatic Automatic   Patient Position Lying Sitting Standing

## 2025-01-27 NOTE — H&P
[facility]     Veronika Nash Patient Status:  Inpatient    1960 MRN [id number]   Location [facility] 2NE-A Attending Kalpesh Tobias MD   Hosp Day # 0 PCP Tee Garrido MD     Chief Complaint: a. fib    Subjective:    History of Present Illness:     Veronika Nash is a 65 year old female with a past medical history of paroxysmal a. Fib, HTN.  She was recently at an OSH with SSS.  Flecainide was reduced.  She is now admitted for tikosyn loading.     History/Other:    Past Medical History:  Past Medical History:    Abdominal distention    Abdominal hernia    Abdominal pain    Acid reflux    Anesthesia complication    bradycardia    Anxiety    Arthritis    Asthma (HCC)    Atypical mole    Back pain    Belching    Bloating    Blurred vision    Body piercing    Change in hair    Chest pain    Decorative tattoo    Diarrhea, unspecified    Esophageal reflux    Essential hypertension    Fatigue    Heart palpitations    Heartburn    Hemorrhoids    High blood pressure    High cholesterol    History of depression    Hypercholesteremia    Hyperlipidemia    Hypertension    Indigestion    Irregular bowel habits    Itch of skin    Night sweats    POLO (obstructive sleep apnea)    AHI-7    Osteoarthritis    Pain in joints    Rash    Sleep disturbance    Stomach ulcer    Stress    Uncomfortable fullness after meals    Wears glasses    Weight gain     Past Surgical History:   Past Surgical History:   Procedure Laterality Date    Appendectomy      Appendectomy      Back surgery      L4-5 Laminectomy    Cholecystectomy      Colonoscopy      Cyst aspiration left      Cyst aspiration right      [place] biopsy stereo nodule 1 site left (cpt=19081) Left 2022    bn    Needle biopsy left  2022    Columnar change    Other surgical history      Exploratory surgery from internal bleeding at age 16 for benign tumor      Family History:   Family History   Problem Relation Age of Onset    Colon  Cancer Father     Colon Polyps Father     Breast Cancer Mother 52    Alcohol and Other Disorders Associated Mother     Heart Attack Mother     Cancer Mother         Lung    Cancer Sister         Lung    Diabetes Brother     Colon Polyps Brother      Social History:    reports that she quit smoking about 2 years ago. Her smoking use included cigarettes. She started smoking about 30 years ago. She has a 14 pack-year smoking history. She has never used smokeless tobacco. She reports that she does not currently use alcohol. She reports that she does not use drugs.     Allergies: Allergies[1]    Medications:  Medications Ordered Prior to Encounter[2]    Review of Systems:   A comprehensive review of systems was completed.    Pertinent positives and negatives noted in the HPI.    Objective:   Physical Exam:    /89 (BP Location: Left arm)   Pulse (!) 132   Temp 97.7 °F (36.5 °C) (Oral)   Resp 26   Wt 223 lb (101.2 kg)   SpO2 94%   BMI 35.99 kg/m²   General: No acute distress, Alert  Respiratory: No rhonchi, no wheezes  Cardiovascular: S1, S2. IR IR, tachy  Abdomen: Soft, Non-tender, Non-distended, Positive bowel sounds  Neuro: No new focal deficits  Extremities: No edema      Results:    Labs:      Labs Last 24 Hours:  Recent Labs   Lab 01/27/25  0921   WBC 7.7   HGB 14.6   MCV 95.6   .0       Recent Labs   Lab 01/27/25  0921   *   BUN 18   CREATSERUM 1.07*   CA 9.7   ALB 4.6      K 4.3      CO2 27.0   ALKPHO 64   AST 78*   ALT 50*   BILT 0.7   TP 7.2       Estimated Glomerular Filtration Rate: 57.8 mL/min/1.73m2 (A) (by CKD-EPI based on SCr of 1.07 mg/dL (H)).    No results for input(s): \"TROP\", \"TROPHS\", \"CK\" in the last 168 hours.    No results for input(s): \"PTP\", \"INR\" in the last 168 hours.    No results for input(s): \"TROP\", \"CK\" in the last 168 hours.      Imaging: Imaging data reviewed in Epic.    Assessment & Plan:      #PAF  Tikoysn loading  DC flecainide, BB and  thiazide  Cont. Xarelto  Plan on DCCV is she does not converte  Baseline EKG and monitor QTC    #HTN  Cozaar started, monitor and adjust as needed    #Ashtma  Cont. Inhalers    #GERD  PPI  #Anxiety/depression  Cont. Home meds      All diagnosis' and recommendations discussed with patient and/or family in detail.      Plan of care discussed with ED physician      Kalpesh Tobias MD    Supplementary Documentation:     The 21st Century Cures Act makes medical notes like these available to patients in the interest of transparency. Please be advised this is a medical document. Medical documents are intended to carry relevant information, facts as evident, and the clinical opinion of the practitioner. The medical note is intended as peer to peer communication and may appear blunt or direct. It is written in medical language and may contain abbreviations or verbiage that are unfamiliar.               **Certification      PHYSICIAN Certification of Need for Inpatient Hospitalization - Initial Certification    Patient will require inpatient services that will reasonably be expected to span two midnight's based on the clinical documentation in H+P.   Based on patients current state of illness, I anticipate that, after discharge, patient will require TBD.                          [1]   Allergies  Allergen Reactions    Bactrim [Sulfamethoxazole W/Trimethoprim, Co-Trimoxazole] HIVES, SWELLING and Dyspnea    Sulfa Antibiotics HIVES, SWELLING and Dyspnea   [2]   No current facility-administered medications on file prior to encounter.     Current Outpatient Medications on File Prior to Encounter   Medication Sig Dispense Refill    doxycycline 100 MG Oral Cap Take 1 capsule (100 mg total) by mouth 2 (two) times daily.      atorvastatin 10 MG Oral Tab Take 1 tablet (10 mg total) by mouth daily.      busPIRone 5 MG Oral Tab Take 1 tablet (5 mg total) by mouth in the morning and 1 tablet (5 mg total) before bedtime.       HYDROcodone-acetaminophen 5-325 MG Oral Tab Take 2 tablets by mouth every 6 (six) hours.      montelukast 10 MG Oral Tab Take 1 tablet (10 mg total) by mouth daily.      Olmesartan Medoxomil-HCTZ 20-12.5 MG Oral Tab Take 1 tablet by mouth daily.      XARELTO 20 MG Oral Tab Xarelto 20 Mg Tab Ameena, [RxNorm: 6142741]      zolpidem 10 MG Oral Tab Take 1 tablet (10 mg total) by mouth daily.      Multiple Vitamin (MULTI-VITAMIN DAILY) Oral Tab Take by mouth daily.      Vitamin C 500 MG Oral Tab Take 1 tablet (500 mg total) by mouth 2 (two) times daily.      Cholecalciferol (VITAMIN D) 2000 units Oral Cap Take 1 capsule (2,000 Units total) by mouth daily.      CINNAMON OR Take 1,000 mg by mouth daily.      gabapentin 300 MG Oral Cap Take 1 capsule (300 mg total) by mouth 2 (two) times daily.      Fenofibrate 145 MG Oral Tab Take 1 tablet (145 mg total) by mouth daily.      Pantoprazole Sodium 40 MG Oral Tab EC Take 1 tablet (40 mg total) by mouth every morning before breakfast.      LORazepam 1 MG Oral Tab Take 1 tablet (1 mg total) by mouth every 4 (four) hours as needed for Anxiety.      escitalopram 20 MG Oral Tab Take 0.5 tablets (10 mg total) by mouth daily.      Albuterol Sulfate (PROAIR HFA IN) Inhale into the lungs as needed.        Budesonide-Formoterol Fumarate 160-4.5 MCG/ACT Inhalation Aerosol Inhale 1 puff into the lungs 2 (two) times daily.

## 2025-01-27 NOTE — CONSULTS
[facility]  Consultation    Veronika Nash Patient Status:  Inpatient    1960 MRN [id number]   Location [facility] 2NE-A Attending Kalpesh Tobias MD   Hosp Day # 0 PCP Tee Garrido MD     Consults    Reason for Consultation:  AF, tikosyn loading    History of Present Illness:  Veronika Nash is a a(n) 65 year old female with h/o paroxysmal AF, HTN presenting for tikosyn load. Recently admitted to [facility] with AF as well as sick sinus. Flecainide was reduced at that time. She is now here for planned tikosyn load as bridge to ablation. Follows with Dr. Sawyer. On Xarelto, has not missed any doses in the past month.    History:  Past Medical History:    Abdominal distention    Abdominal hernia    Abdominal pain    Acid reflux    Anesthesia complication    bradycardia    Anxiety    Arthritis    Asthma (HCC)    Atypical mole    Back pain    Belching    Bloating    Blurred vision    Body piercing    Change in hair    Chest pain    Decorative tattoo    Diarrhea, unspecified    Esophageal reflux    Essential hypertension    Fatigue    Heart palpitations    Heartburn    Hemorrhoids    High blood pressure    High cholesterol    History of depression    Hypercholesteremia    Hyperlipidemia    Hypertension    Indigestion    Irregular bowel habits    Itch of skin    Night sweats    POLO (obstructive sleep apnea)    AHI-7    Osteoarthritis    Pain in joints    Rash    Sleep disturbance    Stomach ulcer    Stress    Uncomfortable fullness after meals    Wears glasses    Weight gain     Past Surgical History:   Procedure Laterality Date    Appendectomy      Appendectomy      Back surgery      L4-5 Laminectomy    Cholecystectomy      Colonoscopy      Cyst aspiration left      Cyst aspiration right      [place] biopsy stereo nodule 1 site left (cpt=19081) Left 2022    bn    Needle biopsy left  2022    Columnar change    Other surgical history      Exploratory surgery from internal bleeding at age 16  for benign tumor     Family History   Problem Relation Age of Onset    Colon Cancer Father     Colon Polyps Father     Breast Cancer Mother 52    Alcohol and Other Disorders Associated Mother     Heart Attack Mother     Cancer Mother         Lung    Cancer Sister         Lung    Diabetes Brother     Colon Polyps Brother       reports that she quit smoking about 2 years ago. Her smoking use included cigarettes. She started smoking about 30 years ago. She has a 14 pack-year smoking history. She has never used smokeless tobacco. She reports that she does not currently use alcohol. She reports that she does not use drugs.    Allergies:  Allergies[1]    Medications:    Current Facility-Administered Medications:     pantoprazole (Protonix) DR tab 40 mg, 40 mg, Oral, QAM AC    LORazepam (Ativan) tab 1 mg, 1 mg, Oral, Q4H PRN    escitalopram (Lexapro) tab 20 mg, 20 mg, Oral, Daily    gabapentin (Neurontin) cap 300 mg, 300 mg, Oral, BID    atorvastatin (Lipitor) tab 10 mg, 10 mg, Oral, Daily    busPIRone (Buspar) tab 5 mg, 5 mg, Oral, Q8H    montelukast (Singulair) tab 10 mg, 10 mg, Oral, After dinner    rivaroxaban (Xarelto) tab 20 mg, 20 mg, Oral, Daily with breakfast    zolpidem (Ambien) tab 10 mg, 10 mg, Oral, Nightly    fenofibrate micronized (Lofibra) cap 134 mg, 134 mg, Oral, Nightly    fluticasone-salmeterol (Advair Diskus) 500-50 MCG/ACT inhaler 1 puff, 1 puff, Inhalation, 2 times daily    umeclidinium bromide (Incruse Ellipta) 62.5 MCG/ACT inhaler 1 puff, 1 puff, Inhalation, Daily    acetaminophen (Tylenol Extra Strength) tab 1,000 mg, 1,000 mg, Oral, Q4H PRN    melatonin tab 3 mg, 3 mg, Oral, Nightly PRN    glycerin-hypromellose- (Artificial Tears) 0.2-0.2-1 % ophthalmic solution 1 drop, 1 drop, Both Eyes, QID PRN    sodium chloride (Saline Mist) 0.65 % nasal solution 1 spray, 1 spray, Each Nare, Q3H PRN    ondansetron (Zofran) 4 MG/2ML injection 4 mg, 4 mg, Intravenous, Q6H PRN    metoclopramide (Reglan) 5  mg/mL injection 10 mg, 10 mg, Intravenous, Q8H PRN    polyethylene glycol (PEG 3350) (Miralax) 17 g oral packet 17 g, 17 g, Oral, Daily PRN    sennosides (Senokot) tab 17.2 mg, 17.2 mg, Oral, Nightly PRN    bisacodyl (Dulcolax) 10 MG rectal suppository 10 mg, 10 mg, Rectal, Daily PRN    fleet enema (Fleet) rectal enema 133 mL, 1 enema, Rectal, Once PRN    HYDROcodone-acetaminophen (Norco) 5-325 MG per tab 2 tablet, 2 tablet, Oral, Q6H PRN    Review of Systems:  Gen: No fevers, chills, fatigue  Head and neck: No headaches  ENT: No visual changes, sore throat   Respiratory: No SOB, cough, wheezing  Cardiac: see HPI  GI: No diarrhea, nausea, vomiting  : No frequency, dysuria  Skin: no rashes  Neuro: No weakness, no numbness  Psych: No depression    OBJECTIVE  Blood pressure 128/89, pulse (!) 132, temperature 97.7 °F (36.5 °C), temperature source Oral, resp. rate 26, weight 223 lb (101.2 kg), SpO2 94%.  Temp (24hrs), Av.7 °F (36.5 °C), Min:97.7 °F (36.5 °C), Max:97.7 °F (36.5 °C)    Wt Readings from Last 3 Encounters:   25 223 lb (101.2 kg)   24 229 lb (103.9 kg)   23 210 lb (95.3 kg)       Physical Exam:  Gen: NAD. A&Ox3  HEENT: JVP not distended  Lungs: CTAB. No wheezes or crackles  Cardiac: Irregular, normal S1/S2, no m/r/g  Abd: soft, NT, ND  Ext: Extremities warm and well perfused. No LE edema  Neuro: No focal deficits    Telemetry: Atrial fibrillation, variable rates    Diagnostics History:  EKG:pending   Echo: preserved LVEF  Stress Test: Unable to reach target HR    Results:   Recent Labs   Lab 25  0921   *   BUN 18   CREATSERUM 1.07*   EGFRCR 58*   CA 9.7      K 4.3      CO2 27.0     Recent Labs   Lab 25  0921   RBC 4.56   HGB 14.6   HCT 43.6   MCV 95.6   MCH 32.0   MCHC 33.5   RDW 13.4   NEPRELIM 3.88   WBC 7.7   .0         No results for input(s): \"BNP\" in the last 168 hours.  Lab Results   Component Value Date    INR 0.97 2022     No results  found for: \"TROP\"      No results found.       Assessment and Plan  65 year old female with h/o paroxysmal AF, HTN presenting for tikosyn load.     Paroxysmal AF  Tikosyn load  - Continue Xarelto 20mg daily. No missed doses in the last month  - Obtain baseline EKG, BMP  - Pending the above, plan to start tikosyn, likely 250mcg Q12 hrs. EKG's 2-3 hrs after every dose for Qtc measurement. See below for full protocol.  - If pt does not convert by 3rd day, plan for DCCV  - Maintain K>4, Mg>2  - Confirm insurance coverage for tikosyn prior to DC    HTN  - Continue home antihypertensives    Dofetilide (Tikosyn) Load Initiation     Each patient requires baseline creatinine level and ECG on admission.  Then, ECG 2- 3 hrs post med administration to monitor QTc for total of 6 doses.    ( Before initiating TIKOSYN therapy, previous antiarrhythmic therapy should be withdrawn for a minimum of 3 plasma half-lives. TIKOSYN should not be initiated following amiodarone therapy until amiodarone plasma levels are below 0.3 mcg/mL or until amiodarone has been withdrawn for at least 3 months.)    Criterias to initiate (Note: certain attending might want other doses besides this):    1.  INR level/K+ level   a. Patient must have therapeutic INR or DOAC for 4 weeks.     b. Tikosyn is a form of chemical cardioversion. Risks for CVA is same.   c. Replace K+ if < 4.0    2. QTc criteria   a. QTc with Narrow QRS    i. < 440 ms --> initiate Tikosyn    ii. > 440 ms --> Tikosyn is contraindicated according to guidelines, call EP   b. QTc with Wide QRS (BBB)    i. < 500 ms --> initiate Tikosyn    ii. > 500 ms --> is contraindicated according to guidelines,    3. Creatinine clearance criteria (with in 1 week of admission)   a. Creatinine clearance >60 --> start 500mcg BID or call EP    i. If QTc 2-3 hours post initial Tikosyn is increased by > 15 %, or if QTc increases to > 500ms (550 ms w/conduction abnormality) at anytime during loading period,  decrease Tikosyn dosage by 50%, and contact EP     b. Creatinine clearance between 40-60 --> start 250mcg BID or call EP    i. If QTc 2-3 hours post initial Tikosyn is increased by > 15 %, or if QTc increases to > 500ms (550 ms w/ ventricular conduction abnormality) at anytime during loading period, decrease Tikosyn dosage by 50%, and contact EP fellow or attending.      c. Creatinine clearance = 20-40 --> start 125mcg BID or call attending or EP Fellow    i. If QTc 2-3 hours post initial Tikosyn is increased by > 15 %, or if QTc increases to > 500ms (550 ms w/conduction abnormality) at anytime during loading period, decrease Tikosyn dosage by 50% (125mcg QD), and contact EP     d. Creatinine clearance < 20  --> Tikosyn is contraindicated     If after subsequent doses the QTc is > 500ms (550 ms w/ ventricular conduction abnormality) --> discontinue Tikosyn, and contact EP      Caution should be used when coadministering Tikosyn w/ macrolide antibiotics, azole antifungals, protease inhibitor, and SRIs - may increase blood levels of Tikosyn     Please alert  after 3rd dose of Dofetilide so that discharge prescriptions can be arranged (NOT ALL PHARMACIES CARRY TIKOSYN)      Plan discussed with patient.    Please call with questions. Thank you for your consult.    Martin Syed MD  Cardiac Electrophysiology  [facility]         [1]   Allergies  Allergen Reactions    Bactrim [Sulfamethoxazole W/Trimethoprim, Co-Trimoxazole] HIVES, SWELLING and Dyspnea    Sulfa Antibiotics HIVES, SWELLING and Dyspnea

## 2025-01-28 LAB
ANION GAP SERPL CALC-SCNC: 10 MMOL/L (ref 0–18)
ATRIAL RATE: 63 BPM
ATRIAL RATE: 70 BPM
ATRIAL RATE: 76 BPM
BUN BLD-MCNC: 18 MG/DL (ref 9–23)
CALCIUM BLD-MCNC: 9.1 MG/DL (ref 8.7–10.6)
CHLORIDE SERPL-SCNC: 101 MMOL/L (ref 98–112)
CO2 SERPL-SCNC: 29 MMOL/L (ref 21–32)
CREAT BLD-MCNC: 0.92 MG/DL
EGFRCR SERPLBLD CKD-EPI 2021: 69 ML/MIN/1.73M2 (ref 60–?)
GLUCOSE BLD-MCNC: 120 MG/DL (ref 70–99)
MAGNESIUM SERPL-MCNC: 1.9 MG/DL (ref 1.6–2.6)
MAGNESIUM SERPL-MCNC: 1.9 MG/DL (ref 1.6–2.6)
OSMOLALITY SERPL CALC.SUM OF ELEC: 293 MOSM/KG (ref 275–295)
P AXIS: 63 DEGREES
P-R INTERVAL: 184 MS
P-R INTERVAL: 218 MS
P-R INTERVAL: 224 MS
POTASSIUM SERPL-SCNC: 3.7 MMOL/L (ref 3.5–5.1)
Q-T INTERVAL: 354 MS
Q-T INTERVAL: 436 MS
Q-T INTERVAL: 468 MS
QRS DURATION: 88 MS
QRS DURATION: 88 MS
QRS DURATION: 94 MS
QTC CALCULATION (BEZET): 398 MS
QTC CALCULATION (BEZET): 446 MS
QTC CALCULATION (BEZET): 505 MS
R AXIS: -42 DEGREES
R AXIS: -48 DEGREES
R AXIS: -48 DEGREES
SODIUM SERPL-SCNC: 140 MMOL/L (ref 136–145)
T AXIS: 50 DEGREES
T AXIS: 7 DEGREES
T AXIS: 78 DEGREES
VENTRICULAR RATE: 63 BPM
VENTRICULAR RATE: 70 BPM
VENTRICULAR RATE: 76 BPM

## 2025-01-28 PROCEDURE — 99232 SBSQ HOSP IP/OBS MODERATE 35: CPT | Performed by: HOSPITALIST

## 2025-01-28 RX ORDER — METOCLOPRAMIDE HYDROCHLORIDE 5 MG/ML
5 INJECTION INTRAMUSCULAR; INTRAVENOUS EVERY 8 HOURS PRN
Status: DISCONTINUED | OUTPATIENT
Start: 2025-01-28 | End: 2025-01-30

## 2025-01-28 RX ORDER — POTASSIUM CHLORIDE 1500 MG/1
40 TABLET, EXTENDED RELEASE ORAL ONCE
Status: COMPLETED | OUTPATIENT
Start: 2025-01-28 | End: 2025-01-28

## 2025-01-28 RX ORDER — ALBUTEROL SULFATE 90 UG/1
2 INHALANT RESPIRATORY (INHALATION) EVERY 6 HOURS PRN
Status: DISCONTINUED | OUTPATIENT
Start: 2025-01-28 | End: 2025-01-30

## 2025-01-28 RX ORDER — MAGNESIUM OXIDE 400 MG/1
400 TABLET ORAL DAILY
Qty: 90 TABLET | Refills: 0 | Status: SHIPPED | OUTPATIENT
Start: 2025-01-28 | End: 2025-01-28

## 2025-01-28 RX ORDER — LOSARTAN POTASSIUM 25 MG/1
25 TABLET ORAL DAILY
Qty: 90 TABLET | Refills: 0 | Status: SHIPPED | OUTPATIENT
Start: 2025-01-29

## 2025-01-28 RX ORDER — DOFETILIDE 0.25 MG/1
250 CAPSULE ORAL EVERY 12 HOURS
Qty: 60 CAPSULE | Refills: 2 | Status: SHIPPED | OUTPATIENT
Start: 2025-01-28 | End: 2025-01-29

## 2025-01-28 RX ORDER — DOXYCYCLINE 100 MG/1
100 CAPSULE ORAL 2 TIMES DAILY
Status: DISCONTINUED | OUTPATIENT
Start: 2025-01-28 | End: 2025-01-30

## 2025-01-28 NOTE — PLAN OF CARE
Problem: Patient/Family Goals  Goal: Patient/Family Long Term Goal  Description: Patient's Long Term Goal: Stay healthy when I discharge     Interventions:  - Attend follow up appointments as needed  - Follow medication regimen   - See additional Care Plan goals for specific interventions  Outcome: Progressing  Goal: Patient/Family Short Term Goal  Description: Patient's Short Term Goal: To feel better    Interventions:   - Tele monitoring  - Monitor labs  - Tikosyn loading   - Pain management   - See additional Care Plan goals for specific interventions  Outcome: Progressing     Problem: CARDIOVASCULAR - ADULT  Goal: Maintains optimal cardiac output and hemodynamic stability  Description: INTERVENTIONS:  - Monitor vital signs, rhythm, and trends  - Monitor for bleeding, hypotension and signs of decreased cardiac output  - Evaluate effectiveness of vasoactive medications to optimize hemodynamic stability  - Monitor arterial and/or venous puncture sites for bleeding and/or hematoma  - Assess quality of pulses, skin color and temperature  - Assess for signs of decreased coronary artery perfusion - ex. Angina  - Evaluate fluid balance, assess for edema, trend weights  Outcome: Progressing  Goal: Absence of cardiac arrhythmias or at baseline  Description: INTERVENTIONS:  - Continuous cardiac monitoring, monitor vital signs, obtain 12 lead EKG if indicated  - Evaluate effectiveness of antiarrhythmic and heart rate control medications as ordered  - Initiate emergency measures for life threatening arrhythmias  - Monitor electrolytes and administer replacement therapy as ordered  Outcome: Progressing     Problem: RESPIRATORY - ADULT  Goal: Achieves optimal ventilation and oxygenation  Description: INTERVENTIONS:  - Assess for changes in respiratory status  - Assess for changes in mentation and behavior  - Position to facilitate oxygenation and minimize respiratory effort  - Oxygen supplementation based on oxygen  saturation or ABGs  - Provide Smoking Cessation handout, if applicable  - Encourage broncho-pulmonary hygiene including cough, deep breathe, Incentive Spirometry  - Assess the need for suctioning and perform as needed  - Assess and instruct to report SOB or any respiratory difficulty  - Respiratory Therapy support as indicated  - Manage/alleviate anxiety  - Monitor for signs/symptoms of CO2 retention  Outcome: Progressing

## 2025-01-28 NOTE — PLAN OF CARE
Received patient from NOC RN at 0730. Patient A&O x4 and on room air, bilat lung sounds clear to diminished. Patient voiding without complications and is reporting no pain at this time, resting in bed. Fall precautions in place, call light and belongings within reach. Plan of care evolving.     POC:   - Tikosyn loading   - scheduled EKGs  - monitor telemetry    Problem: Patient/Family Goals  Goal: Patient/Family Long Term Goal  Description: Patient's Long Term Goal: Stay healthy when I discharge     Interventions:  - Attend follow up appointments as needed  - Follow medication regimen   - See additional Care Plan goals for specific interventions  Outcome: Progressing  Goal: Patient/Family Short Term Goal  Description: Patient's Short Term Goal: To feel better    Interventions:   - Tele monitoring  - Monitor labs  - Tikosyn loading   - Pain management   - See additional Care Plan goals for specific interventions  Outcome: Progressing     Problem: CARDIOVASCULAR - ADULT  Goal: Maintains optimal cardiac output and hemodynamic stability  Description: INTERVENTIONS:  - Monitor vital signs, rhythm, and trends  - Monitor for bleeding, hypotension and signs of decreased cardiac output  - Evaluate effectiveness of vasoactive medications to optimize hemodynamic stability  - Monitor arterial and/or venous puncture sites for bleeding and/or hematoma  - Assess quality of pulses, skin color and temperature  - Assess for signs of decreased coronary artery perfusion - ex. Angina  - Evaluate fluid balance, assess for edema, trend weights  Outcome: Progressing  Goal: Absence of cardiac arrhythmias or at baseline  Description: INTERVENTIONS:  - Continuous cardiac monitoring, monitor vital signs, obtain 12 lead EKG if indicated  - Evaluate effectiveness of antiarrhythmic and heart rate control medications as ordered  - Initiate emergency measures for life threatening arrhythmias  - Monitor electrolytes and administer replacement  therapy as ordered  Outcome: Progressing     Problem: RESPIRATORY - ADULT  Goal: Achieves optimal ventilation and oxygenation  Description: INTERVENTIONS:  - Assess for changes in respiratory status  - Assess for changes in mentation and behavior  - Position to facilitate oxygenation and minimize respiratory effort  - Oxygen supplementation based on oxygen saturation or ABGs  - Provide Smoking Cessation handout, if applicable  - Encourage broncho-pulmonary hygiene including cough, deep breathe, Incentive Spirometry  - Assess the need for suctioning and perform as needed  - Assess and instruct to report SOB or any respiratory difficulty  - Respiratory Therapy support as indicated  - Manage/alleviate anxiety  - Monitor for signs/symptoms of CO2 retention  Outcome: Progressing

## 2025-01-28 NOTE — PROGRESS NOTES
[place] PROGRESS NOTE      Veronika Nash Patient Status:  Inpatient    1960 MRN [id number]   Location [facility] 2NE-A Attending Kalpesh Tobias MD   Hosp Day # 1 PCP Tee Garrido MD       Subjective:  No chest pain or shortness of breath. Started tikosyn yesterday evening. Converted to NSR after first dose    ROS:  No abdominal pain, no cough, no fevers, chills, denies musculoskeletal pain.     Objective:  /52 (BP Location: Right arm)   Pulse 67   Temp 97.9 °F (36.6 °C) (Oral)   Resp 15   Wt 223 lb (101.2 kg)   SpO2 92%   BMI 35.99 kg/m²     Temp (24hrs), Av.9 °F (36.6 °C), Min:97.7 °F (36.5 °C), Max:98.2 °F (36.8 °C)      Intake/Output:    Intake/Output Summary (Last 24 hours) at 2025 0959  Last data filed at 2025 0735  Gross per 24 hour   Intake 1460 ml   Output 1580 ml   Net -120 ml       Wt Readings from Last 2 Encounters:   25 223 lb (101.2 kg)   24 229 lb (103.9 kg)       Physical Exam:    Gen: NAD. A&Ox3  HEENT: JVP not distended  Lungs: CTAB. No wheezes or crackles  Cardiac: RRR, normal S1/S2, no m/r/g  Abd: soft, NT, ND  Ext: Extremities warm and well perfused. No LE edema  Neuro: No focal deficits      Labs:     Lab Results   Component Value Date    INR 0.97 2022     Lab Results   Component Value Date     2025    K 3.7 2025     2025    CO2 29.0 2025    BUN 18 2025    CREATSERUM 0.92 2025     2025    MG 1.9 2025    MG 1.9 2025    CA 9.1 2025        No results found for: \"TROP\", \"CKMB\"     Medications:     pantoprazole  40 mg Oral QAM AC    gabapentin  300 mg Oral BID    atorvastatin  10 mg Oral Daily    montelukast  10 mg Oral After dinner    rivaroxaban  20 mg Oral Daily with breakfast    zolpidem  10 mg Oral Nightly    fenofibrate micronized  134 mg Oral Nightly    fluticasone-salmeterol  1 puff Inhalation 2 times daily    umeclidinium  bromide  1 puff Inhalation Daily    losartan  25 mg Oral Daily    dofetilide  250 mcg Oral Q12H    magnesium oxide  800 mg Oral BID with meals    escitalopram  10 mg Oral Daily    busPIRone  5 mg Oral BID           Assessment/Plan   65 year old female with h/o paroxysmal AF, HTN presenting for tikosyn load.      Paroxysmal AF  Tikosyn load  - Continue Xarelto 20mg daily. No missed doses in the last month  - Continue tikosyn 250mcg Q12 hrs. EKG's 2-3 hrs after every dose for Qtc measurement.   - Daily BMP  - Maintain K>4, Mg>2. Will likely need scheduled mg repletion on discharge  - Confirm insurance coverage for tikosyn prior to DC     HTN  - Continue losartan. Hydrochlorothiazide held due to interaction with tikosyn. BP well controlled    Martin Syed MD  Cardiac Electrophysiology  [facility]

## 2025-01-28 NOTE — CM/SW NOTE
SW noted and acknowledged order to check Tikosyn availability and pricing.     SW called [facility] ([id number]) and was informed that copay for Tikosyn is $8.78 and pharmacy staff confirmed they have the med in stock.     CHERELLE updated RN on cost and availability.     to remain available for support and/or discharge planning.    NAZ Angel  Discharge Planner  [phone]

## 2025-01-28 NOTE — PAYOR COMM NOTE
--------------  ADMISSION REVIEW   1/27-1/28  Payor: BINH BAKER O  Subscriber #:  [id number]  Authorization Number: [id number]    Admit date: 1/27/25  Admit time:  8:30 AM       REVIEW DOCUMENTATION:    1/27 Cardiology    Reason for Consultation:  AF, tikosyn loading     History of Present Illness:  Veronika Nash is a a(n) 65 year old female with h/o paroxysmal AF, HTN presenting for tikosyn load. Recently admitted to [facility] with AF as well as sick sinus. Flecainide was reduced at that time. She is now here for planned tikosyn load as bridge to ablation. Follows with Dr. Sawyer. On Xarelto, has not missed any doses in the past month.    Assessment and Plan  65 year old female with h/o paroxysmal AF, HTN presenting for tikosyn load.      Paroxysmal AF  Tikosyn load  - Continue Xarelto 20mg daily. No missed doses in the last month  - Obtain baseline EKG, BMP  - Pending the above, plan to start tikosyn, likely 250mcg Q12 hrs. EKG's 2-3 hrs after every dose for Qtc measurement. See below for full protocol.  - If pt does not convert by 3rd day, plan for DCCV  - Maintain K>4, Mg>2  - Confirm insurance coverage for tikosyn prior to DC     HTN  - Continue home antihypertensives     Dofetilide (Tikosyn) Load Initiation      Each patient requires baseline creatinine level and ECG on admission.  Then, ECG 2- 3 hrs post med administration to monitor QTc for total of 6 doses.     ( Before initiating TIKOSYN therapy, previous antiarrhythmic therapy should be withdrawn for a minimum of 3 plasma half-lives. TIKOSYN should not be initiated following amiodarone therapy until amiodarone plasma levels are below 0.3 mcg/mL or until amiodarone has been withdrawn for at least 3 months.)     Criterias to initiate (Note: certain attending might want other doses besides this):     1.           INR level/K+ level               a.          Patient must have therapeutic INR or DOAC for 4 weeks.                 b.          Tikosyn is a  form of chemical cardioversion. Risks for CVA is same.               c.           Replace K+ if < 4.0     2.QTc criteria               a.          QTc with Narrow QRS                            i.            < 440 ms --> initiate Tikosyn                            ii.           > 440 ms --> Tikosyn is contraindicated according to guidelines, call EP               b.QTc with Wide QRS (BBB)                            i.            < 500 ms --> initiate Tikosyn                            ii.           > 500 ms --> is contraindicated according to guidelines,     3.Creatinine clearance criteria (with in 1 week of admission)               a.          Creatinine clearance >60 --> start 500mcg BID or call EP                            i.If QTc 2-3 hours post initial Tikosyn is increased by > 15 %, or if QTc increases to > 500ms (550 ms w/conduction abnormality) at anytime during loading period, decrease Tikosyn dosage by 50%, and contact EP                  b.          Creatinine clearance between 40-60 --> start 250mcg BID or call EP                            i.            If QTc 2-3 hours post initial Tikosyn is increased by > 15 %, or if QTc increases to > 500ms (550 ms w/ ventricular conduction abnormality) at anytime during loading period, decrease Tikosyn dosage by 50%, and contact EP fellow or attending.                   c.           Creatinine clearance = 20-40 --> start 125mcg BID or call attending or EP Fellow                            i.            If QTc 2-3 hours post initial Tikosyn is increased by > 15 %, or if QTc increases to > 500ms (550 ms w/conduction abnormality) at anytime during loading period, decrease Tikosyn dosage by 50% (125mcg QD), and contact EP                  d.          Creatinine clearance < 20  --> Tikosyn is contraindicated                 If after subsequent doses the QTc is > 500ms (550 ms w/ ventricular conduction abnormality) --> discontinue Tikosyn, and contact EP      Caution  should be used when coadministering Tikosyn w/ macrolide antibiotics, azole antifungals, protease inhibitor, and SRIs - may increase blood levels of Tikosyn     Please alert  after 3rd dose of Dofetilide so that discharge prescriptions can be arranged (NOT ALL PHARMACIES CARRY TIKOSYN)            1/27 H&P    Chief Complaint: a. fib        Subjective:  History of Present Illness:      Veronika Nash is a 65 year old female with a past medical history of paroxysmal a. Fib, HTN.  She was recently at an OSH with SSS.  Flecainide was reduced.  She is now admitted for tikosyn loading.     Past Medical History:    Abdominal distention    Abdominal hernia    Abdominal pain    Acid reflux    Anesthesia complication     bradycardia    Anxiety    Arthritis    Asthma (HCC)    Atypical mole    Back pain    Belching    Bloating    Blurred vision    Body piercing    Change in hair    Chest pain    Decorative tattoo    Diarrhea, unspecified    Esophageal reflux    Essential hypertension    Fatigue    Heart palpitations    Heartburn    Hemorrhoids    High blood pressure    High cholesterol    History of depression    Hypercholesteremia    Hyperlipidemia    Hypertension    Indigestion    Irregular bowel habits    Itch of skin    Night sweats    POLO (obstructive sleep apnea)     AHI-7    Osteoarthritis    Pain in joints    Rash    Sleep disturbance    Stomach ulcer    Stress    Uncomfortable fullness after meals    Wears glasses    Weight gain       /89 (BP Location: Left arm)   Pulse (!) 132   Temp 97.7 °F (36.5 °C) (Oral)   Resp 26   Wt 223 lb (101.2 kg)   SpO2 94%   BMI 35.99 kg/m²   General: No acute distress, Alert  Respiratory: No rhonchi, no wheezes  Cardiovascular: S1, S2. IR IR, tachy  Abdomen: Soft, Non-tender, Non-distended, Positive bowel sounds  Neuro: No new focal deficits  Extremities: No edema      Lab 01/27/25  0921   WBC 7.7   HGB 14.6   MCV 95.6   .0             Recent Labs   Lab  25  0921   *   BUN 18   CREATSERUM 1.07*   CA 9.7   ALB 4.6      K 4.3      CO2 27.0   ALKPHO 64   AST 78*   ALT 50*   BILT 0.7   TP 7.2       Assessment & Plan:  #PAF  Tikoysn loading  DC flecainide, BB and thiazide  Cont. Xarelto  Plan on DCCV is she does not converte  Baseline EKG and monitor QTC     #HTN  Cozaar started, monitor and adjust as needed     #Ashtma  Cont. Inhalers     #GERD  PPI  #Anxiety/depression  Cont. Home meds             cardiology    No chest pain or shortness of breath. Started tikosyn yesterday evening. Converted to NSR after first dose     ROS:  No abdominal pain, no cough, no fevers, chills, denies musculoskeletal pain.      Objective:  /52 (BP Location: Right arm)   Pulse 67   Temp 97.9 °F (36.6 °C) (Oral)   Resp 15   Wt 223 lb (101.2 kg)   SpO2 92%   BMI 35.99 kg/m²      Temp (24hrs), Av.9 °F (36.6 °C), Min:97.7 °F (36.5 °C), Max:98.2 °F (36.8 °C)        Intake/Output:     Intake/Output Summary (Last 24 hours) at 2025 0959  Last data filed at 2025 0735      Gross per 24 hour   Intake 1460 ml   Output 1580 ml   Net -120 ml     65 year old female with h/o paroxysmal AF, HTN presenting for tikosyn load.      Paroxysmal AF  Tikosyn load  - Continue Xarelto 20mg daily. No missed doses in the last month  - Continue tikosyn 250mcg Q12 hrs. EKG's 2-3 hrs after every dose for Qtc measurement.   - Daily BMP  - Maintain K>4, Mg>2. Will likely need scheduled mg repletion on discharge  - Confirm insurance coverage for tikosyn prior to DC     HTN  - Continue losartan. Hydrochlorothiazide held due to interaction with tikosyn. BP well controlled           IM    Temp:  [97.7 °F (36.5 °C)-98.2 °F (36.8 °C)] 97.9 °F (36.6 °C)  Pulse:  [67-82] 67  Resp:  [15-22] 15  BP: (108-133)/(52-87) 108/52  SpO2:  [91 %-95 %] 92 %     Physical Exam:    General: No acute distress  Respiratory: Clear to auscultation bilaterally  Cardiovascular: S1,  S2.  Abdomen: Soft  Neuro: No new focal deficits            Recent Labs   Lab 01/27/25  0921 01/28/25  0626   * 120*   BUN 18 18   CREATSERUM 1.07* 0.92   CA 9.7 9.1   ALB 4.6  --     140   K 4.3 3.7    101   CO2 27.0 29.0   ALKPHO 64  --    AST 78*  --    ALT 50*  --    BILT 0.7  --    TP 7.2  --        #PAF  Tikoysn  DC flecainide, BB and thiazide  Cont. Xarelto  Converted to NSR  Baseline EKG and monitor QTC     #HTN  Cozaar started, monitor and adjust as needed     #Ashtma  Cont. Inhalers     #GERD  PPI  #Anxiety/depression  Cont. Home meds      MEDICATIONS ADMINISTERED IN LAST 1 DAY:  atorvastatin (Lipitor) tab 10 mg       Date Action Dose Route User    1/28/2025 0818 Given 10 mg Oral Marlene Jacinto RN          busPIRone (Buspar) tab 5 mg       Date Action Dose Route User    1/28/2025 0818 Given 5 mg Oral Marlene Jacinto RN          dofetilide (Tikosyn) cap 250 mcg       Date Action Dose Route User    1/28/2025 0818 Given 250 mcg Oral Marlene Jacinto RN    1/27/2025 2007 Given 250 mcg Oral Asaf Houston RN          escitalopram (Lexapro) tab 10 mg       Date Action Dose Route User    1/28/2025 0818 Given 10 mg Oral Marlene Jacinto RN    1/27/2025 1553 Given 10 mg Oral Barbara Fox RN          fenofibrate micronized (Lofibra) cap 134 mg       Date Action Dose Route User    1/27/2025 2052 Given 134 mg Oral Asaf Houston RN          gabapentin (Neurontin) cap 300 mg       Date Action Dose Route User    1/28/2025 0818 Given 300 mg Oral Marlene Jacinto RN    1/27/2025 2052 Given 300 mg Oral Asaf Houston RN          HYDROcodone-acetaminophen (Norco) 5-325 MG per tab 2 tablet       Date Action Dose Route User    1/28/2025 0557 Given 2 tablet Oral Asaf Houston RN    1/27/2025 1831 Given 2 tablet Oral Fox, Barbara, RN          losartan (Cozaar) tab 25 mg       Date Action Dose Route User    1/28/2025 0830 Given 25 mg Oral Marlene Jacinto, RN          magnesium oxide (Mag-Ox)  tab 800 mg       Date Action Dose Route User    1/28/2025 0818 Given 800 mg Oral Marlene Jacinto RN    1/27/2025 1830 Given 800 mg Oral Barbara Fox RN    1/27/2025 1418 Given 800 mg Oral Barbara Fox RN          magnesium sulfate in sterile water for injection 2 g/50mL IVPB premix 2 g       Date Action Dose Route User    1/27/2025 1658 New Bag 2 g Intravenous Barbara Fox RN          montelukast (Singulair) tab 10 mg       Date Action Dose Route User    1/27/2025 1830 Given 10 mg Oral Barbara Fox RN          pantoprazole (Protonix) DR tab 40 mg       Date Action Dose Route User    1/28/2025 0557 Given 40 mg Oral Asaf Houston RN          potassium chloride (Klor-Con M20) tab 40 mEq       Date Action Dose Route User    1/28/2025 0818 Given 40 mEq Oral Marlene Jacinto RN          rivaroxaban (Xarelto) tab 20 mg       Date Action Dose Route User    1/28/2025 0818 Given 20 mg Oral Marlene Jacinto RN          zolpidem (Ambien) tab 10 mg       Date Action Dose Route User    1/27/2025 2052 Given 10 mg Oral Asaf Houston RN            Vitals (last day)       Date/Time Temp Pulse Resp BP SpO2 Weight O2 Device O2 Flow Rate (L/min) [facility]    01/28/25 0733 97.9 °F (36.6 °C) -- -- 108/52 92 % -- CPAP -- EG    01/28/25 0444 98 °F (36.7 °C) 67 15 123/69 95 % -- CPAP -- BR    01/27/25 2341 97.7 °F (36.5 °C) 68 17 108/62 91 % -- None (Room air) 0 L/min BR    01/27/25 2056 98.1 °F (36.7 °C) 73 19 124/75 93 % -- None (Room air) 0 L/min BR    01/27/25 1555 98.2 °F (36.8 °C) 82 22 133/87 95 % -- None (Room air) -- NB    01/27/25 1334 97.7 °F (36.5 °C) 78 16 117/80 92 % -- None (Room air) -- NB    01/27/25 0913 97.7 °F (36.5 °C) 132 26 128/89 94 % 223 lb (101.2 kg) None (Room air) -- NB    01/27/25 0908 97.7 °F (36.5 °C) 128 20 128/96 94 % -- None (Room air) -- NB    01/27/25 0907 97.7 °F (36.5 °C) 130 20 123/84 94 % -- None (Room air) -- NB

## 2025-01-28 NOTE — PROGRESS NOTES
[facility]   part of [facility]     Hospitalist Progress Note     Veronika Nash Patient Status:  Inpatient    1960 MRN [id number]   Location [facility] 2NE-A Attending Kalpesh Tobias MD   Hosp Day # 1 PCP Tee Garrido MD     Chief Complaint: a. fib    Subjective:     Patient feels well.     Objective:    Review of Systems:   ROS completed; pertinent positive and negatives stated in subjective.    Vital signs:  Temp:  [97.7 °F (36.5 °C)-98.2 °F (36.8 °C)] 97.9 °F (36.6 °C)  Pulse:  [67-82] 67  Resp:  [15-22] 15  BP: (108-133)/(52-87) 108/52  SpO2:  [91 %-95 %] 92 %    Physical Exam:    General: No acute distress  Respiratory: Clear to auscultation bilaterally  Cardiovascular: S1, S2.  Abdomen: Soft  Neuro: No new focal deficits      Diagnostic Data:    Labs:  Recent Labs   Lab 25  0921   WBC 7.7   HGB 14.6   MCV 95.6   .0       Recent Labs   Lab 25  0921 25  0626   * 120*   BUN 18 18   CREATSERUM 1.07* 0.92   CA 9.7 9.1   ALB 4.6  --     140   K 4.3 3.7    101   CO2 27.0 29.0   ALKPHO 64  --    AST 78*  --    ALT 50*  --    BILT 0.7  --    TP 7.2  --        Estimated Glomerular Filtration Rate: 69.2 mL/min/1.73m2 (by CKD-EPI based on SCr of 0.92 mg/dL).     No results for input(s): \"TROP\", \"TROPHS\", \"CK\" in the last 168 hours.    No results for input(s): \"PTP\", \"INR\" in the last 168 hours.           Imaging: Imaging data reviewed in Epic.    Medications:    pantoprazole  40 mg Oral QAM AC    gabapentin  300 mg Oral BID    atorvastatin  10 mg Oral Daily    montelukast  10 mg Oral After dinner    rivaroxaban  20 mg Oral Daily with breakfast    zolpidem  10 mg Oral Nightly    fenofibrate micronized  134 mg Oral Nightly    fluticasone-salmeterol  1 puff Inhalation 2 times daily    umeclidinium bromide  1 puff Inhalation Daily    losartan  25 mg Oral Daily    dofetilide  250 mcg Oral Q12H    magnesium oxide  800 mg Oral BID with meals    escitalopram   10 mg Oral Daily    busPIRone  5 mg Oral BID       Assessment & Plan:     #PAF  Tikoysn  DC flecainide, BB and thiazide  Cont. Xarelto  Converted to NSR  Baseline EKG and monitor QTC     #HTN  Cozaar started, monitor and adjust as needed     #Ashtma  Cont. Inhalers     #GERD  PPI  #Anxiety/depression  Cont. Home meds          Kalpesh Tobias MD    Supplementary Documentation:     Quality:    DVT Mechanical Prophylaxis:     Early ambuation  DVT Pharmacologic Prophylaxis   Medication    rivaroxaban (Xarelto) tab 20 mg                Code Status: Prior  Valdez: No urinary catheter in place  Valdez Duration (in days):   Central line:    ELIANE: 1/29/2025      Discharge is dependent on: clinical stability  At this point Ms. Nash is expected to be discharge to: home    The 21st Century Cures Act makes medical notes like these available to patients in the interest of transparency. Please be advised this is a medical document. Medical documents are intended to carry relevant information, facts as evident, and the clinical opinion of the practitioner. The medical note is intended as peer to peer communication and may appear blunt or direct. It is written in medical language and may contain abbreviations or verbiage that are unfamiliar.

## 2025-01-29 LAB
ANION GAP SERPL CALC-SCNC: 7 MMOL/L (ref 0–18)
ATRIAL RATE: 59 BPM
ATRIAL RATE: 69 BPM
BUN BLD-MCNC: 16 MG/DL (ref 9–23)
CALCIUM BLD-MCNC: 9.4 MG/DL (ref 8.7–10.6)
CHLORIDE SERPL-SCNC: 104 MMOL/L (ref 98–112)
CO2 SERPL-SCNC: 30 MMOL/L (ref 21–32)
CREAT BLD-MCNC: 0.93 MG/DL
EGFRCR SERPLBLD CKD-EPI 2021: 68 ML/MIN/1.73M2 (ref 60–?)
GLUCOSE BLD-MCNC: 116 MG/DL (ref 70–99)
MAGNESIUM SERPL-MCNC: 1.8 MG/DL (ref 1.6–2.6)
OSMOLALITY SERPL CALC.SUM OF ELEC: 294 MOSM/KG (ref 275–295)
P AXIS: 19 DEGREES
P-R INTERVAL: 210 MS
P-R INTERVAL: 216 MS
POTASSIUM SERPL-SCNC: 4.2 MMOL/L (ref 3.5–5.1)
POTASSIUM SERPL-SCNC: 4.2 MMOL/L (ref 3.5–5.1)
Q-T INTERVAL: 324 MS
Q-T INTERVAL: 466 MS
QRS DURATION: 102 MS
QRS DURATION: 98 MS
QTC CALCULATION (BEZET): 347 MS
QTC CALCULATION (BEZET): 461 MS
R AXIS: -43 DEGREES
R AXIS: -45 DEGREES
SODIUM SERPL-SCNC: 141 MMOL/L (ref 136–145)
T AXIS: -5 DEGREES
T AXIS: 47 DEGREES
VENTRICULAR RATE: 59 BPM
VENTRICULAR RATE: 69 BPM

## 2025-01-29 PROCEDURE — 99232 SBSQ HOSP IP/OBS MODERATE 35: CPT | Performed by: INTERNAL MEDICINE

## 2025-01-29 RX ORDER — DOFETILIDE 0.12 MG/1
125 CAPSULE ORAL EVERY 12 HOURS
Qty: 60 CAPSULE | Refills: 0 | Status: SHIPPED | OUTPATIENT
Start: 2025-01-29

## 2025-01-29 RX ORDER — DOFETILIDE 0.12 MG/1
125 CAPSULE ORAL EVERY 12 HOURS
Status: DISCONTINUED | OUTPATIENT
Start: 2025-01-29 | End: 2025-01-30

## 2025-01-29 RX ORDER — MAGNESIUM SULFATE 1 G/100ML
1 INJECTION INTRAVENOUS ONCE
Status: COMPLETED | OUTPATIENT
Start: 2025-01-29 | End: 2025-01-29

## 2025-01-29 RX ORDER — DOFETILIDE 0.12 MG/1
125 CAPSULE ORAL EVERY 12 HOURS
Status: DISCONTINUED | OUTPATIENT
Start: 2025-01-29 | End: 2025-01-29

## 2025-01-29 NOTE — CM/SW NOTE
SW notified by RN that new script for Tikosyn was sent to [facility].    SW called [facility] ([id number]) and was informed that copay for the new Tikosyn script is $34.30 and pharmacy staff confirmed they have the med in stock.    SW updated RN on cost and availability.     to remain available for support and/or discharge planning.    NAZ Angel  Discharge Planner  [phone]

## 2025-01-29 NOTE — PROGRESS NOTES
[facility]   part of [facility]     Hospitalist Progress Note     Veronika Nash Patient Status:  Inpatient    1960 MRN [id number]   Location [facility] 2NE-A Attending Kalpesh Tobias MD   Hosp Day # 2 PCP Tee Garrido MD     Chief Complaint: a. fib    Subjective:     Feels fine but short of breath with exertion, comfortable at rest     Objective:    Review of Systems:   ROS completed; pertinent positive and negatives stated in subjective.    Vital signs:  Temp:  [97.7 °F (36.5 °C)-98.3 °F (36.8 °C)] 98.3 °F (36.8 °C)  Pulse:  [57-73] 68  Resp:  [18-23] 18  BP: (119-149)/(62-81) 133/81  SpO2:  [90 %-94 %] 92 %    Physical Exam:    General: No acute distress  Respiratory: Clear to auscultation bilaterally  Cardiovascular: S1, S2.  Abdomen: Soft  Neuro: No new focal deficits      Diagnostic Data:    Labs:  Recent Labs   Lab 25  0921   WBC 7.7   HGB 14.6   MCV 95.6   .0       Recent Labs   Lab 25  0921 25  0626 25  0549   * 120* 116*   BUN 18 18 16   CREATSERUM 1.07* 0.92 0.93   CA 9.7 9.1 9.4   ALB 4.6  --   --     140 141   K 4.3 3.7 4.2  4.2    101 104   CO2 27.0 29.0 30.0   ALKPHO 64  --   --    AST 78*  --   --    ALT 50*  --   --    BILT 0.7  --   --    TP 7.2  --   --        Estimated Glomerular Filtration Rate: 68.3 mL/min/1.73m2 (by CKD-EPI based on SCr of 0.93 mg/dL).     No results for input(s): \"TROP\", \"TROPHS\", \"CK\" in the last 168 hours.    No results for input(s): \"PTP\", \"INR\" in the last 168 hours.           Imaging: Imaging data reviewed in Epic.    Medications:    dofetilide  125 mcg Oral Q12H    doxycycline  100 mg Oral BID    pantoprazole  40 mg Oral QAM AC    gabapentin  300 mg Oral BID    atorvastatin  10 mg Oral Daily    montelukast  10 mg Oral After dinner    rivaroxaban  20 mg Oral Daily with breakfast    zolpidem  10 mg Oral Nightly    fenofibrate micronized  134 mg Oral Nightly    fluticasone-salmeterol  1 puff  Inhalation 2 times daily    umeclidinium bromide  1 puff Inhalation Daily    losartan  25 mg Oral Daily    magnesium oxide  800 mg Oral BID with meals    escitalopram  10 mg Oral Daily    busPIRone  5 mg Oral BID       Assessment & Plan:     #PAF  -Tikoysn  -DC flecainide, BB and thiazide  -Cont. Xarelto  -Converted to NSR  -monitor QTC     #HTN  -Cozaar started, monitor and adjust as needed     #Ashtma  -Cont. Inhalers     #GERD  -PPI    #Anxiety/depression  Cont. Home meds          Kalpesh Tobias MD    Supplementary Documentation:     Quality:    DVT Mechanical Prophylaxis:     Early ambuation  DVT Pharmacologic Prophylaxis   Medication    rivaroxaban (Xarelto) tab 20 mg                Code Status: Prior  Valdez: No urinary catheter in place  Valdez Duration (in days):   Central line:    ELIANE: 1/30/2025      Discharge is dependent on: clinical stability  At this point Ms. Nash is expected to be discharge to: home    The 21st Century Cures Act makes medical notes like these available to patients in the interest of transparency. Please be advised this is a medical document. Medical documents are intended to carry relevant information, facts as evident, and the clinical opinion of the practitioner. The medical note is intended as peer to peer communication and may appear blunt or direct. It is written in medical language and may contain abbreviations or verbiage that are unfamiliar.

## 2025-01-29 NOTE — PROGRESS NOTES
[facility] PROGRESS NOTE      Veronika Nash Patient Status:  Inpatient    1960 MRN [id number]   Location [facility] 2NE-A Attending Kalpesh Tobias MD   Hosp Day # 2 PCP Tee Garrido MD       Subjective:  No chest pain or shortness of breath. No VT. In NSR.    ROS:  No abdominal pain, no cough, no fevers, chills, denies musculoskeletal pain.     Objective:  /77 (BP Location: Left arm)   Pulse 57   Temp 98 °F (36.7 °C) (Oral)   Resp 18   Wt 223 lb (101.2 kg)   SpO2 94%   BMI 35.99 kg/m²     Temp (24hrs), Av.9 °F (36.6 °C), Min:97.7 °F (36.5 °C), Max:98.2 °F (36.8 °C)      Intake/Output:    Intake/Output Summary (Last 24 hours) at 2025 0845  Last data filed at 2025 0738  Gross per 24 hour   Intake 2160 ml   Output 220 ml   Net 1940 ml       Wt Readings from Last 2 Encounters:   25 223 lb (101.2 kg)   24 229 lb (103.9 kg)       Physical Exam:    Gen: NAD. A&Ox3  HEENT: JVP not distended  Lungs: CTAB. No wheezes or crackles  Cardiac: RRR, normal S1/S2, no m/r/g  Abd: soft, NT, ND  Ext: Extremities warm and well perfused. No LE edema  Neuro: No focal deficits      Labs:     Lab Results   Component Value Date    INR 0.97 2022     Lab Results   Component Value Date     2025    K 4.2 2025    K 4.2 2025     2025    CO2 30.0 2025    BUN 16 2025    CREATSERUM 0.93 2025     2025    MG 1.8 2025    CA 9.4 2025        No results found for: \"TROP\", \"CKMB\"     Medications:     dofetilide  125 mcg Oral Q12H    doxycycline  100 mg Oral BID    pantoprazole  40 mg Oral QAM AC    gabapentin  300 mg Oral BID    atorvastatin  10 mg Oral Daily    montelukast  10 mg Oral After dinner    rivaroxaban  20 mg Oral Daily with breakfast    zolpidem  10 mg Oral Nightly    fenofibrate micronized  134 mg Oral Nightly    fluticasone-salmeterol  1 puff Inhalation 2 times daily     umeclidinium bromide  1 puff Inhalation Daily    losartan  25 mg Oral Daily    magnesium oxide  800 mg Oral BID with meals    escitalopram  10 mg Oral Daily    busPIRone  5 mg Oral BID           Assessment/Plan   65 year old female with h/o paroxysmal AF, HTN presenting for tikosyn load.      Paroxysmal AF  Tikosyn load  - Continue Xarelto 20mg daily. No missed doses in the last month  - Qtc slightly prolonged. DECREASE tikosyn to 125mcg Q12 hrs. EKG's 2-3 hrs after every dose for Qtc measurement.   - Daily BMP  - Maintain K>4, Mg>2. Will likely need scheduled mg repletion on discharge  - Confirm insurance coverage for tikosyn prior to DC     HTN  - Continue losartan. Hydrochlorothiazide held due to interaction with tikosyn. BP well controlled    Martin Syed MD  Cardiac Electrophysiology  [facility]

## 2025-01-29 NOTE — PLAN OF CARE
Pt admitted with arrythmia, and tikosen loading dose order. Pt on  fourth day of dose. Cardiology managing. Pt  noted to be in NSR. Tikosen dose decreased this am due to QTC length. Pt receiving magnesium this afternoon. Pt has potential discharge tomorrow after Tikosen is completed. Patient and daughter agreeable to plan of care.

## 2025-01-30 VITALS
DIASTOLIC BLOOD PRESSURE: 77 MMHG | HEART RATE: 62 BPM | WEIGHT: 223 LBS | SYSTOLIC BLOOD PRESSURE: 140 MMHG | TEMPERATURE: 98 F | BODY MASS INDEX: 36 KG/M2 | OXYGEN SATURATION: 93 % | RESPIRATION RATE: 18 BRPM

## 2025-01-30 LAB
ANION GAP SERPL CALC-SCNC: 6 MMOL/L (ref 0–18)
ATRIAL RATE: 55 BPM
ATRIAL RATE: 70 BPM
BUN BLD-MCNC: 16 MG/DL (ref 9–23)
CALCIUM BLD-MCNC: 9.7 MG/DL (ref 8.7–10.6)
CHLORIDE SERPL-SCNC: 104 MMOL/L (ref 98–112)
CO2 SERPL-SCNC: 30 MMOL/L (ref 21–32)
CREAT BLD-MCNC: 0.95 MG/DL
EGFRCR SERPLBLD CKD-EPI 2021: 66 ML/MIN/1.73M2 (ref 60–?)
GLUCOSE BLD-MCNC: 107 MG/DL (ref 70–99)
MAGNESIUM SERPL-MCNC: 1.8 MG/DL (ref 1.6–2.6)
OSMOLALITY SERPL CALC.SUM OF ELEC: 292 MOSM/KG (ref 275–295)
P-R INTERVAL: 216 MS
P-R INTERVAL: 240 MS
POTASSIUM SERPL-SCNC: 4.4 MMOL/L (ref 3.5–5.1)
Q-T INTERVAL: 458 MS
Q-T INTERVAL: 462 MS
QRS DURATION: 90 MS
QRS DURATION: 96 MS
QTC CALCULATION (BEZET): 441 MS
QTC CALCULATION (BEZET): 494 MS
R AXIS: -30 DEGREES
R AXIS: -35 DEGREES
SODIUM SERPL-SCNC: 140 MMOL/L (ref 136–145)
T AXIS: -29 DEGREES
T AXIS: -3 DEGREES
VENTRICULAR RATE: 55 BPM
VENTRICULAR RATE: 70 BPM

## 2025-01-30 PROCEDURE — 99239 HOSP IP/OBS DSCHRG MGMT >30: CPT | Performed by: INTERNAL MEDICINE

## 2025-01-30 RX ORDER — MAGNESIUM GLYCINATE 100 MG
800 CAPSULE ORAL 2 TIMES DAILY
Qty: 120 CAPSULE | Refills: 0 | Status: SHIPPED | OUTPATIENT
Start: 2025-01-30

## 2025-01-30 RX ORDER — MAGNESIUM GLYCINATE 100 MG
800 CAPSULE ORAL 2 TIMES DAILY
Qty: 120 CAPSULE | Refills: 0 | Status: SHIPPED | OUTPATIENT
Start: 2025-01-30 | End: 2025-01-30

## 2025-01-30 RX ORDER — SERTRALINE HYDROCHLORIDE 25 MG/1
25 TABLET, FILM COATED ORAL DAILY
Status: DISCONTINUED | OUTPATIENT
Start: 2025-01-30 | End: 2025-01-30

## 2025-01-30 RX ORDER — MAGNESIUM SULFATE 1 G/100ML
1 INJECTION INTRAVENOUS ONCE
Status: COMPLETED | OUTPATIENT
Start: 2025-01-30 | End: 2025-01-30

## 2025-01-30 RX ORDER — MAGNESIUM GLYCINATE 100 MG
400 CAPSULE ORAL 2 TIMES DAILY
Qty: 120 CAPSULE | Refills: 0 | Status: SHIPPED | OUTPATIENT
Start: 2025-01-30 | End: 2025-01-30

## 2025-01-30 RX ORDER — SERTRALINE HYDROCHLORIDE 25 MG/1
25 TABLET, FILM COATED ORAL DAILY
Qty: 30 TABLET | Refills: 0 | Status: SHIPPED | OUTPATIENT
Start: 2025-01-30

## 2025-01-30 NOTE — PLAN OF CARE
Receive patient at 1930. Patient is A&O x 4 Independent in room. Lungs sound are clear. Maintaining SpO2 >90% on RA. Sinus en with 1st degree block per tele monitor. Resting in bed, reports back pain 6/10, which is tolerable, declined pain medication. Patients uses CPAP at night. Bed in low position and call light within reach. Reviewed plan of care and patient verbalizes understanding.     Problem: Patient/Family Goals  Goal: Patient/Family Long Term Goal  Description: Patient's Long Term Goal: Stay healthy when I discharge     Interventions:  - Attend follow up appointments as needed  - Follow medication regimen   - See additional Care Plan goals for specific interventions  Outcome: Progressing  Goal: Patient/Family Short Term Goal  Description: Patient's Short Term Goal: To feel better    Interventions:   - Tele monitoring  - Monitor labs  - Tikosyn loading   - Pain management   - See additional Care Plan goals for specific interventions  Outcome: Progressing     Problem: CARDIOVASCULAR - ADULT  Goal: Maintains optimal cardiac output and hemodynamic stability  Description: INTERVENTIONS:  - Monitor vital signs, rhythm, and trends  - Monitor for bleeding, hypotension and signs of decreased cardiac output  - Evaluate effectiveness of vasoactive medications to optimize hemodynamic stability  - Monitor arterial and/or venous puncture sites for bleeding and/or hematoma  - Assess quality of pulses, skin color and temperature  - Assess for signs of decreased coronary artery perfusion - ex. Angina  - Evaluate fluid balance, assess for edema, trend weights  Outcome: Progressing  Goal: Absence of cardiac arrhythmias or at baseline  Description: INTERVENTIONS:  - Continuous cardiac monitoring, monitor vital signs, obtain 12 lead EKG if indicated  - Evaluate effectiveness of antiarrhythmic and heart rate control medications as ordered  - Initiate emergency measures for life threatening arrhythmias  - Monitor electrolytes  and administer replacement therapy as ordered  Outcome: Progressing     Problem: RESPIRATORY - ADULT  Goal: Achieves optimal ventilation and oxygenation  Description: INTERVENTIONS:  - Assess for changes in respiratory status  - Assess for changes in mentation and behavior  - Position to facilitate oxygenation and minimize respiratory effort  - Oxygen supplementation based on oxygen saturation or ABGs  - Provide Smoking Cessation handout, if applicable  - Encourage broncho-pulmonary hygiene including cough, deep breathe, Incentive Spirometry  - Assess the need for suctioning and perform as needed  - Assess and instruct to report SOB or any respiratory difficulty  - Respiratory Therapy support as indicated  - Manage/alleviate anxiety  - Monitor for signs/symptoms of CO2 retention  Outcome: Progressing

## 2025-01-30 NOTE — PLAN OF CARE
NURSING DISCHARGE NOTE    Pt cleared fro discharge by all consults. Medications delivered from Meds to beds. Printed script given for Magnesium Glycinate.   Discharged Home via Wheelchair.  Accompanied by Support staff  Belongings Taken by patient/family.    Tele and IV discontinued without any complications. Pt provided with discharge paperwork and educated on discharge instructions including medication changes and follow up appointments. All questions answered. Pt transferred to [facility].

## 2025-01-30 NOTE — PLAN OF CARE
Assumed care of patient at 0700. Pt A/Ox 4. O2 sats maintained on room air, CPAP at night. NSR with 1st degree AVB on tele. Tikosyn loading. Last BM 1/29. Voiding without difficulty. Pt reports chronic back pain, managed with PRN Norco. Pt up independently, instructed to call with any changes. Pt updated on plan of care. Care needs met. Bed in lowest position, Call light within reach.     POC: Tikosyn loading, EKGs, possible discharge today     Problem: Patient/Family Goals  Goal: Patient/Family Long Term Goal  Description: Patient's Long Term Goal: Stay healthy when I discharge     Interventions:  - Attend follow up appointments as needed  - Follow medication regimen   - See additional Care Plan goals for specific interventions  Outcome: Progressing  Goal: Patient/Family Short Term Goal  Description: Patient's Short Term Goal: To feel better    Interventions:   - Tele monitoring  - Monitor labs  - Tikosyn loading   - Pain management   - See additional Care Plan goals for specific interventions  Outcome: Progressing     Problem: CARDIOVASCULAR - ADULT  Goal: Maintains optimal cardiac output and hemodynamic stability  Description: INTERVENTIONS:  - Monitor vital signs, rhythm, and trends  - Monitor for bleeding, hypotension and signs of decreased cardiac output  - Evaluate effectiveness of vasoactive medications to optimize hemodynamic stability  - Monitor arterial and/or venous puncture sites for bleeding and/or hematoma  - Assess quality of pulses, skin color and temperature  - Assess for signs of decreased coronary artery perfusion - ex. Angina  - Evaluate fluid balance, assess for edema, trend weights  Outcome: Progressing  Goal: Absence of cardiac arrhythmias or at baseline  Description: INTERVENTIONS:  - Continuous cardiac monitoring, monitor vital signs, obtain 12 lead EKG if indicated  - Evaluate effectiveness of antiarrhythmic and heart rate control medications as ordered  - Initiate emergency measures for  life threatening arrhythmias  - Monitor electrolytes and administer replacement therapy as ordered  Outcome: Progressing     Problem: RESPIRATORY - ADULT  Goal: Achieves optimal ventilation and oxygenation  Description: INTERVENTIONS:  - Assess for changes in respiratory status  - Assess for changes in mentation and behavior  - Position to facilitate oxygenation and minimize respiratory effort  - Oxygen supplementation based on oxygen saturation or ABGs  - Provide Smoking Cessation handout, if applicable  - Encourage broncho-pulmonary hygiene including cough, deep breathe, Incentive Spirometry  - Assess the need for suctioning and perform as needed  - Assess and instruct to report SOB or any respiratory difficulty  - Respiratory Therapy support as indicated  - Manage/alleviate anxiety  - Monitor for signs/symptoms of CO2 retention  Outcome: Progressing

## 2025-01-30 NOTE — CM/SW NOTE
01/30/25 1200   CM/SW Referral Data   Referral Source Social Work (self-referral)   Reason for Referral PHQ4/PHQ9;Discharge planning   Informant EMR;Clinical Staff Member;Patient   Medical Hx   Does patient have an established PCP? Yes   Patient Info   Patient's Current Mental Status at Time of Assessment Alert;Oriented   Patient's Home Environment House   Number of Levels in Home 2   Patient lives with Daughter   Patient Status Prior to Admission   Independent with ADLs and Mobility Yes   Services in place prior to admission DME/Supplies at home   Type of DME/Supplies CPAP   Discharge Needs   Anticipated D/C needs No anticipated discharge needs  (MH resources)     Patient is a 64 y/o female admitted due for Tikosyn loading. SW noted and acknowledged order for PHQ4.    SW met with pt at bedside to discuss PHQ4 concerns and DC plan. Pt reports she lives in a 2 level duplex with her daughter. Pt denied ownin/using any AD but stated she owns a CPAP. Pt reports being independent with his ADLs and mobility.     SW discussed PHQ4 concerns regarding anxiety/depression. Pt reports she has dealt with anxiety/depression since her  passed away in 2008. Pt did state that she is on antidepressant medication and has been on them since 2008. Pt stated they will be switching her antidepressant to Zoloft at [place]. Pt reports she has good support from family and friends. Pt denied currently participating in any therapy but stated she has considered it in the past. CHERELLE inquired if pt would like MH resources to be added to her AVS. Pt agreeable with this.    SW inquired about any other anticipated DC need. Pt denied having any anticipated needs and reports she will drive herself home at [place].    Pt thanked CHERELLE for stopping by and denied having any further questions at this time.    CHERELLE messaged psych liaison, Mirian Ferro, and requested for her to add MH resources to pt's AVS. Mirian confirmed resources were added.    Social  worker to remain available for support and/or discharge planning.    Jocelynn Garber YULY  Discharge Planner  [phone]

## 2025-01-30 NOTE — PROGRESS NOTES
Progress Note  Veronika Nash Patient Status:  Inpatient    1960 MRN [id number]   Location [facility] 2NE-A Attending Mercedes Tatum,    Hosp Day # 3 PCP Tee Garrido MD     Subjective:  In bed on exam. Tired otherwise no complaints.     Objective:  /78 (BP Location: Left arm)   Pulse 58   Temp 98.1 °F (36.7 °C) (Oral)   Resp 17   Wt 223 lb (101.2 kg)   SpO2 91%   BMI 35.99 kg/m²     Telemetry: nsr/sb      Intake/Output:    Intake/Output Summary (Last 24 hours) at 2025 0829  Last data filed at 2025 0732  Gross per 24 hour   Intake 1580 ml   Output 0 ml   Net 1580 ml       Last 3 Weights   25 0913 223 lb (101.2 kg)   24 0837 229 lb (103.9 kg)   23 0758 210 lb (95.3 kg)       Labs:  Recent Labs   Lab 25  0626 25  0549 25  0644   * 116* 107*   BUN 18 16 16   CREATSERUM 0.92 0.93 0.95   EGFRCR 69 68 66   CA 9.1 9.4 9.7    141 140   K 3.7 4.2  4.2 4.4    104 104   CO2 29.0 30.0 30.0     Recent Labs   Lab 25  0921   RBC 4.56   HGB 14.6   HCT 43.6   MCV 95.6   MCH 32.0   MCHC 33.5   RDW 13.4   NEPRELIM 3.88   WBC 7.7   .0         No results for input(s): \"TROP\", \"TROPHS\", \"CK\" in the last 168 hours.    Review of Systems   Cardiovascular: Negative.    Respiratory: Negative.         Physical Exam:    Gen: alert, oriented x 3, NAD  Heent: pupils equal, reactive. Mucous membranes moist.   Neck: no jvd  Cardiac: regular rate and rhythm, normal S1,S2  Lungs: CTA  Abd: soft, NT/ND +bs  Ext: no edema  Skin: Warm, dry  Neuro: No focal deficits        Medications:     dofetilide  125 mcg Oral Q12H    doxycycline  100 mg Oral BID    pantoprazole  40 mg Oral QAM AC    gabapentin  300 mg Oral BID    atorvastatin  10 mg Oral Daily    montelukast  10 mg Oral After dinner    rivaroxaban  20 mg Oral Daily with breakfast    zolpidem  10 mg Oral Nightly    fenofibrate micronized  134 mg Oral Nightly    fluticasone-salmeterol  1 puff  Inhalation 2 times daily    umeclidinium bromide  1 puff Inhalation Daily    losartan  25 mg Oral Daily    magnesium oxide  800 mg Oral BID with meals    [Held by provider] escitalopram  10 mg Oral Daily    busPIRone  5 mg Oral BID             Assessment:  PAF admitted for tikosyn loading (dose 6 this am)  Dose reduced 1/29 due to slightly prolonged qtc.   EKG overnight stable with qtc ~466ms.   Cont Xarelto.   Maintain K>4 and Mg>2  Plans for future ablation with Dr. Sawyer.  Preserved LVEF  HTN-controlled on arb  Hydrochlorothiazide stopped due to interaction with tikosyn. Do not resume  POLO-cpap    Plan:  Appreciate  assistance with med availability and cost.   EKG reviewed with Dr. Syed this am. Qtc stable. Continue current dosing. Dose 6 this am. EKG 2 hours after.   Hold lexapro-will discuss with hospitalist if alternative option.   Has been getting 800mg Mg bid scheduled. Will need this at dc.   Got additional Mg 1g IV rider yesterday. Mg 1.8 today. Repeat dose today.   Sinus en. 40s while asleep otherwise 50-60s, not on any av darrell blocking agents.    Plan of care discussed with patient, RN.    Meghana Thomas, APRN  1/30/2025  8:29 AM        Patient seen and examined by me today.    Gen: NAD. A&Ox3  HEENT: JVP not distended  Lungs: CTAB. No wheezes or crackles  Cardiac: RRR, normal S1/S2, no m/r/g  Abd: soft, NT, ND  Ext: Extremities warm and well perfused. No LE edema  Neuro: No focal deficits      I have personally performed the medical decision making in its entirety. My additions include:    - Qtc within acceptable limits. Continue tikosyn 125mcg q12h  - Stop lexapro. Will start alternate SSRI with lower TdP risk (Paxil preferred)  - Continue Mg supplementation on discharge  - Continue losartan/ARB on discharge, no hydrochlorothiazide  - Continue Xarelto daily  - DC today after afternoon EKG    Martin Syed MD  Cardiac Electrophysiology  [facility]

## 2025-01-30 NOTE — DISCHARGE INSTRUCTIONS
Outpatient Therapy and Psychiatry [facility]  [address] [place] [address]   [phone]  [facility]  [address] [place] [address]   [phone]  [facility]   [address] [place]. [address]  [phone]  [facility]   [address] [place].[address]          [phone]  [facility]              [address] [place] [address]   [phone]  [address] [address]   [phone]  [facility]  [address] [place] [address]   [address]  [place] [address]  [phone]  [facility]  [address] [place] [address]   [phone]

## 2025-01-30 NOTE — DISCHARGE SUMMARY
[place] HOSPITALIST  DISCHARGE SUMMARY     Veronika Nash Patient Status:  Inpatient    1960 MRN [id number]   [facility] 2NE-A Attending Mercedes Tatum, DO   Hosp Day # 3 PCP Tee Garrido MD     Date of Admission: 2025  Date of Discharge:   2025    Discharge Disposition: Home or Self Care    Discharge Diagnosis:  #PAF  -Tikoysn  -DC flecainide, BB (bradycardic) and thiazide  -Cont. Xarelto  -Converted to NSR  -monitor QTC     #HTN  -Cozaar started, monitor and adjust as needed     #Restrictive lung disease - suspected to be due to body habitus, Trelegy Is too expensive for her, she can discontinue      #GERD  -PPI     #Anxiety/depression  Escitalopram stopped in favor of Zoloft due to less Qtc prolongation - f/u with PCP for titration     History of Present Illness:      Veronika Nash is a 65 year old female with a past medical history of paroxysmal a. Fib, HTN.  She was recently at an OSH with SSS.  Flecainide was reduced.  She is now admitted for tikosyn loading.     Brief Synopsis: Patient is admitted, QTc monitored, course uncomplicated, converted to NSR, discharged home in stable condition.    Lace+ Score: 38  59-90 High Risk  29-58 Medium Risk  0-28   Low Risk       TCM Follow-Up Recommendation:  LACE 29-58: Moderate Risk of readmission after discharge from the hospital.      Procedures during hospitalization:   N/a    Incidental or significant findings and recommendations (brief descriptions):  N/a    Lab/Test results pending at Discharge:   N/a    Consultants:  EP    Discharge Medication List:     Discharge Medications        START taking these medications        Instructions Prescription details   dofetilide 125 MCG Caps  Commonly known as: Tikosyn      Take 1 capsule (125 mcg total) by mouth every 12 (twelve) hours.   Quantity: 60 capsule  Refills: 0     losartan 25 MG Tabs  Commonly known as: Cozaar      Take 1 tablet (25 mg total) by mouth daily.   Quantity: 90  tablet  Refills: 0     Magnesium Glycinate 100 MG Caps      Take 800 mg by mouth in the morning and 800 mg before bedtime.   Quantity: 120 capsule  Refills: 0     sertraline 25 MG Tabs  Commonly known as: Zoloft      Take 1 tablet (25 mg total) by mouth daily.   Quantity: 30 tablet  Refills: 0            CONTINUE taking these medications        Instructions Prescription details   atorvastatin 10 MG Tabs  Commonly known as: Lipitor      Take 1 tablet (10 mg total) by mouth daily.   Refills: 0     busPIRone 5 MG Tabs  Commonly known as: Buspar      Take 1 tablet (5 mg total) by mouth in the morning and 1 tablet (5 mg total) before bedtime.   Refills: 0     CINNAMON OR      Take 1,000 mg by mouth daily.   Refills: 0     doxycycline 100 MG Caps  Commonly known as: Vibramycin      Take 1 capsule (100 mg total) by mouth 2 (two) times daily.   Refills: 0     fenofibrate 145 MG Tabs  Commonly known as: Tricor      Take 1 tablet (145 mg total) by mouth daily.   Refills: 0     gabapentin 300 MG Caps  Commonly known as: Neurontin      Take 1 capsule (300 mg total) by mouth 2 (two) times daily.   Refills: 0     HYDROcodone-acetaminophen 5-325 MG Tabs  Commonly known as: Norco      Take 2 tablets by mouth every 6 (six) hours.   Refills: 0     LORazepam 1 MG Tabs  Commonly known as: Ativan      Take 1 tablet (1 mg total) by mouth every 4 (four) hours as needed for Anxiety.   Refills: 0     montelukast 10 MG Tabs  Commonly known as: Singulair      Take 1 tablet (10 mg total) by mouth daily.   Refills: 0     Multi-Vitamin Daily Tabs  Notes to patient: Take as directed       Take by mouth daily.   Refills: 0     pantoprazole 40 MG Tbec  Commonly known as: Protonix      Take 1 tablet (40 mg total) by mouth every morning before breakfast.   Refills: 0     PROAIR HFA IN      Inhale into the lungs as needed.   Refills: 0     Vitamin D 50 MCG (2000 UT) Caps      Take 1 capsule (2,000 Units total) by mouth daily.   Refills: 0     Xarelto  20 MG Tabs  Generic drug: rivaroxaban      Take 1 tablet (20 mg total) by mouth daily with food.   Refills: 0     zolpidem 10 MG Tabs  Commonly known as: Ambien      Take 1 tablet (10 mg total) by mouth daily.   Refills: 0            STOP taking these medications      escitalopram 20 MG Tabs  Commonly known as: Lexapro        magnesium oxide 400 MG Tabs  Commonly known as: Mag-Ox        Olmesartan Medoxomil-HCTZ 20-12.5 MG Tabs  Commonly known as: BENICAR HCT        Vitamin C 500 MG Tabs  Commonly known as: VITAMIN C                  Where to Get Your Medications        These medications [facility] [address] [phone], [phone]  [address] [place] [address], [address] [facility], [facility] [address]      Phone: [phone]   dofetilide 125 MCG Caps  losartan 25 MG Tabs  sertraline 25 MG Tabs       Please  your prescriptions at the location directed by your doctor or nurse    Bring a paper prescription for each of these medications  Magnesium Glycinate 100 MG Caps         [place] reviewed[place] n[place]a    Follow-up appointment:   Tee Garrido MD  [address] [place] [address] [id number] [phone]    Go in 1 week(s)  2/5@2:30 with ez ng    [facility]  [address] [facility] [address]  [place] [address] [phone]  Schedule an appointment as soon as possible for a visit on 2/3/2025  EKG at 10am on 2/3/25  follow up with APN on 2/7 at 1:30pm.    Appointments for Next 30 Days 1/30/2025 - 3/1/2025      None            Vital signs:  Temp:  [97.8 °F (36.6 °C)-98.3 °F (36.8 °C)] 97.9 °F (36.6 °C)  Pulse:  [57-76] 62  Resp:  [14-18] 18  BP: (115-140)/(63-81) 140/77  SpO2:  [91 %-97 %] 93 %    Physical Exam:    General: No acute distress   Lungs: clear to auscultation  Cardiovascular: S1, S2  Abdomen: Soft      -----------------------------------------------------------------------------------------------  PATIENT DISCHARGE INSTRUCTIONS: See electronic chart    DO Liza Gale  time spent on discharge plannin minutes     The 21st Century Cures Act makes medical notes like these available to patients in the interest of transparency. Please be advised this is a medical document. Medical documents are intended to carry relevant information, facts as evident, and the clinical opinion of the practitioner. The medical note is intended as peer to peer communication and may appear blunt or direct. It is written in medical language and may contain abbreviations or verbiage that are unfamiliar.

## 2025-01-31 NOTE — H&P
Martin Syed MD  Physician  Cardiology     Consults     Signed     Date of Service: 2025 10:15 AM     Signed       Expand All Collapse All      [facility]  Consultation           Veronika Nash Patient Status:  Inpatient    1960 MRN [id number]   Location [facility] 2NE-A Attending Kalpesh Tobias MD   Hosp Day # 0 PCP Tee Garrido MD      Consults     Reason for Consultation:  AF, tikosyn loading     History of Present Illness:  Veronika Nash is a a(n) 65 year old female with h/o paroxysmal AF, HTN presenting for tikosyn load. Recently admitted to [facility] with AF as well as sick sinus. Flecainide was reduced at that time. She is now here for planned tikosyn load as bridge to ablation. Follows with Dr. Sawyer. On Xarelto, has not missed any doses in the past month.     History:  Past Medical History       Past Medical History:    Abdominal distention    Abdominal hernia    Abdominal pain    Acid reflux    Anesthesia complication     bradycardia    Anxiety    Arthritis    Asthma (HCC)    Atypical mole    Back pain    Belching    Bloating    Blurred vision    Body piercing    Change in hair    Chest pain    Decorative tattoo    Diarrhea, unspecified    Esophageal reflux    Essential hypertension    Fatigue    Heart palpitations    Heartburn    Hemorrhoids    High blood pressure    High cholesterol    History of depression    Hypercholesteremia    Hyperlipidemia    Hypertension    Indigestion    Irregular bowel habits    Itch of skin    Night sweats    POLO (obstructive sleep apnea)     AHI-7    Osteoarthritis    Pain in joints    Rash    Sleep disturbance    Stomach ulcer    Stress    Uncomfortable fullness after meals    Wears glasses    Weight gain         Past Surgical History         Past Surgical History:   Procedure Laterality Date    Appendectomy        Appendectomy        Back surgery         L4-5 Laminectomy    Cholecystectomy        Colonoscopy        Cyst aspiration left         Cyst aspiration right        [place] biopsy stereo nodule 1 site left (cpt=19081) Left 03/31/2022     bn    Needle biopsy left   03/2022     Columnar change    Other surgical history         Exploratory surgery from internal bleeding at age 16 for benign tumor         Family History         Family History   Problem Relation Age of Onset    Colon Cancer Father      Colon Polyps Father      Breast Cancer Mother 52    Alcohol and Other Disorders Associated Mother      Heart Attack Mother      Cancer Mother           Lung    Cancer Sister           Lung    Diabetes Brother      Colon Polyps Brother            reports that she quit smoking about 2 years ago. Her smoking use included cigarettes. She started smoking about 30 years ago. She has a 14 pack-year smoking history. She has never used smokeless tobacco. She reports that she does not currently use alcohol. She reports that she does not use drugs.     Allergies:  [Allergies]    [Allergies]       Allergen Reactions    Bactrim [Sulfamethoxazole W/Trimethoprim, Co-Trimoxazole] HIVES, SWELLING and Dyspnea    Sulfa Antibiotics HIVES, SWELLING and Dyspnea        Medications:    Current Hospital Medications      Current Facility-Administered Medications:     pantoprazole (Protonix) DR tab 40 mg, 40 mg, Oral, QAM AC    LORazepam (Ativan) tab 1 mg, 1 mg, Oral, Q4H PRN    escitalopram (Lexapro) tab 20 mg, 20 mg, Oral, Daily    gabapentin (Neurontin) cap 300 mg, 300 mg, Oral, BID    atorvastatin (Lipitor) tab 10 mg, 10 mg, Oral, Daily    busPIRone (Buspar) tab 5 mg, 5 mg, Oral, Q8H    montelukast (Singulair) tab 10 mg, 10 mg, Oral, After dinner    rivaroxaban (Xarelto) tab 20 mg, 20 mg, Oral, Daily with breakfast    zolpidem (Ambien) tab 10 mg, 10 mg, Oral, Nightly    fenofibrate micronized (Lofibra) cap 134 mg, 134 mg, Oral, Nightly    fluticasone-salmeterol (Advair Diskus) 500-50 MCG/ACT inhaler 1 puff, 1 puff, Inhalation, 2 times daily    umeclidinium bromide (Incruse  Ellipta) 62.5 MCG/ACT inhaler 1 puff, 1 puff, Inhalation, Daily    acetaminophen (Tylenol Extra Strength) tab 1,000 mg, 1,000 mg, Oral, Q4H PRN    melatonin tab 3 mg, 3 mg, Oral, Nightly PRN    glycerin-hypromellose- (Artificial Tears) 0.2-0.2-1 % ophthalmic solution 1 drop, 1 drop, Both Eyes, QID PRN    sodium chloride (Saline Mist) 0.65 % nasal solution 1 spray, 1 spray, Each Nare, Q3H PRN    ondansetron (Zofran) 4 MG/2ML injection 4 mg, 4 mg, Intravenous, Q6H PRN    metoclopramide (Reglan) 5 mg/mL injection 10 mg, 10 mg, Intravenous, Q8H PRN    polyethylene glycol (PEG 3350) (Miralax) 17 g oral packet 17 g, 17 g, Oral, Daily PRN    sennosides (Senokot) tab 17.2 mg, 17.2 mg, Oral, Nightly PRN    bisacodyl (Dulcolax) 10 MG rectal suppository 10 mg, 10 mg, Rectal, Daily PRN    fleet enema (Fleet) rectal enema 133 mL, 1 enema, Rectal, Once PRN    HYDROcodone-acetaminophen (Norco) 5-325 MG per tab 2 tablet, 2 tablet, Oral, Q6H PRN        Review of Systems:  Gen: No fevers, chills, fatigue  Head and neck: No headaches  ENT: No visual changes, sore throat   Respiratory: No SOB, cough, wheezing  Cardiac: see HPI  GI: No diarrhea, nausea, vomiting  : No frequency, dysuria  Skin: no rashes  Neuro: No weakness, no numbness  Psych: No depression     OBJECTIVE  Blood pressure 128/89, pulse (!) 132, temperature 97.7 °F (36.5 °C), temperature source Oral, resp. rate 26, weight 223 lb (101.2 kg), SpO2 94%.  Temp (24hrs), Av.7 °F (36.5 °C), Min:97.7 °F (36.5 °C), Max:97.7 °F (36.5 °C)         Wt Readings from Last 3 Encounters:   25 223 lb (101.2 kg)   24 229 lb (103.9 kg)   23 210 lb (95.3 kg)         Physical Exam:  Gen: NAD. A&Ox3  HEENT: JVP not distended  Lungs: CTAB. No wheezes or crackles  Cardiac: Irregular, normal S1/S2, no m/r/g  Abd: soft, NT, ND  Ext: Extremities warm and well perfused. No LE edema  Neuro: No focal deficits     Telemetry: Atrial fibrillation, variable rates      Diagnostics History:  EKG:pending   Echo: preserved LVEF  Stress Test: Unable to reach target HR     Results:       Recent Labs   Lab 01/27/25  0921   *   BUN 18   CREATSERUM 1.07*   EGFRCR 58*   CA 9.7      K 4.3      CO2 27.0          Recent Labs   Lab 01/27/25  0921   RBC 4.56   HGB 14.6   HCT 43.6   MCV 95.6   MCH 32.0   MCHC 33.5   RDW 13.4   NEPRELIM 3.88   WBC 7.7   .0            No results for input(s): \"BNP\" in the last 168 hours.        Lab Results   Component Value Date     INR 0.97 03/31/2022      No results found for: \"TROP\"        No results found.         Assessment and Plan  65 year old female with h/o paroxysmal AF, HTN presenting for tikosyn load.      Paroxysmal AF  Tikosyn load  - Continue Xarelto 20mg daily. No missed doses in the last month  - Obtain baseline EKG, BMP  - Pending the above, plan to start tikosyn, likely 250mcg Q12 hrs. EKG's 2-3 hrs after every dose for Qtc measurement. See below for full protocol.  - If pt does not convert by 3rd day, plan for DCCV  - Maintain K>4, Mg>2  - Confirm insurance coverage for tikosyn prior to DC     HTN  - Continue home antihypertensives     Dofetilide (Tikosyn) Load Initiation      Each patient requires baseline creatinine level and ECG on admission.  Then, ECG 2- 3 hrs post med administration to monitor QTc for total of 6 doses.     ( Before initiating TIKOSYN therapy, previous antiarrhythmic therapy should be withdrawn for a minimum of 3 plasma half-lives. TIKOSYN should not be initiated following amiodarone therapy until amiodarone plasma levels are below 0.3 mcg/mL or until amiodarone has been withdrawn for at least 3 months.)     Criterias to initiate (Note: certain attending might want other doses besides this):     1.           INR level/K+ level               a.          Patient must have therapeutic INR or DOAC for 4 weeks.                 b.          Tikosyn is a form of chemical cardioversion. Risks for CVA is  same.               c.           Replace K+ if < 4.0     2.QTc criteria               a.          QTc with Narrow QRS                            i.            < 440 ms --> initiate Tikosyn                            ii.           > 440 ms --> Tikosyn is contraindicated according to guidelines, call EP               b.QTc with Wide QRS (BBB)                            i.            < 500 ms --> initiate Tikosyn                            ii.           > 500 ms --> is contraindicated according to guidelines,     3.Creatinine clearance criteria (with in 1 week of admission)               a.          Creatinine clearance >60 --> start 500mcg BID or call EP                            i.If QTc 2-3 hours post initial Tikosyn is increased by > 15 %, or if QTc increases to > 500ms (550 ms w/conduction abnormality) at anytime during loading period, decrease Tikosyn dosage by 50%, and contact EP                  b.          Creatinine clearance between 40-60 --> start 250mcg BID or call EP                            i.            If QTc 2-3 hours post initial Tikosyn is increased by > 15 %, or if QTc increases to > 500ms (550 ms w/ ventricular conduction abnormality) at anytime during loading period, decrease Tikosyn dosage by 50%, and contact EP fellow or attending.                   c.           Creatinine clearance = 20-40 --> start 125mcg BID or call attending or EP Fellow                            i.            If QTc 2-3 hours post initial Tikosyn is increased by > 15 %, or if QTc increases to > 500ms (550 ms w/conduction abnormality) at anytime during loading period, decrease Tikosyn dosage by 50% (125mcg QD), and contact EP                  d.          Creatinine clearance < 20  --> Tikosyn is contraindicated                 If after subsequent doses the QTc is > 500ms (550 ms w/ ventricular conduction abnormality) --> discontinue Tikosyn, and contact EP      Caution should be used when coadministering Tikosyn w/  macrolide antibiotics, azole antifungals, protease inhibitor, and SRIs - may increase blood levels of Tikosyn     Please alert  after 3rd dose of Dofetilide so that discharge prescriptions can be arranged (NOT ALL PHARMACIES CARRY TIKOSYN)        Plan discussed with patient.     Please call with questions. Thank you for your consult.     Martin Syed MD  Cardiac Electrophysiology  [facility]               Electronically signed by Martin Syed MD at 1/27/2025 10:32 AM         Admission (Discharged) on 1/27/2025            Detailed Report          Note shared with patient  Chart Review: Note Routing History    No routing history on file.

## 2025-01-31 NOTE — PAYOR COMM NOTE
--------------  DISCHARGE REVIEW    [place] [facility]  [place] #:  [id number]  Authorization Number: [id number]    Admit date: 25  Admit time:   8:30 AM  Discharge Date: 2025  2:55 PM     Admitting Physician: Lissette Villanueva MD  Attending Physician:  No att. providers found  Primary Care Physician: Tee Garrido MD          Discharge Summary Notes        Discharge Summary signed by Mercedes Tatum DO at 2025  1:40 PM       Author: Mercedes Tatum DO Specialty: HOSPITALIST, Internal Medicine Author Type: Physician    Filed: 2025  1:40 PM Date of Service: 2025  1:37 PM Status: Signed    : Mercedes Tatum DO (Physician)           [facility]     Veronika Nash Patient Status:  Inpatient    1960 MRN [id number]   Location [facility] 2NE-A Attending Mercedes Tatum DO   Hosp Day # 3 PCP Tee Garrido MD     Date of Admission: 2025  Date of Discharge:   2025    Discharge Disposition: Home or Self Care    Discharge Diagnosis:  #PAF  -Tikoysn  -DC flecainide, BB (bradycardic) and thiazide  -Cont. Xarelto  -Converted to NSR  -monitor QTC     #HTN  -Cozaar started, monitor and adjust as needed     #Restrictive lung disease - suspected to be due to body habitus, Trelegy Is too expensive for her, she can discontinue      #GERD  -PPI     #Anxiety/depression  Escitalopram stopped in favor of Zoloft due to less Qtc prolongation - f/u with PCP for titration     History of Present Illness:      Veronika Nash is a 65 year old female with a past medical history of paroxysmal a. Fib, HTN.  She was recently at an OSH with [facility].  Flecainide was reduced.  She is now admitted for tikosyn loading.     Brief Synopsis: Patient is admitted, QTc monitored, course uncomplicated, converted to NSR, discharged home in stable condition.    Lace+ Score: 38  59-90 High Risk  29-58 Medium Risk  0-28   Low Risk       TCM Follow-Up Recommendation:  LACE 29-58: Moderate Risk  of readmission after discharge from the hospital.      Procedures during hospitalization:   N/a    Incidental or significant findings and recommendations (brief descriptions):  N/a    Lab/Test results pending at Discharge:   N/a    Consultants:  EP    Discharge Medication List:     Discharge Medications        START taking these medications        Instructions Prescription details   dofetilide 125 MCG Caps  Commonly known as: Tikosyn      Take 1 capsule (125 mcg total) by mouth every 12 (twelve) hours.   Quantity: 60 capsule  Refills: 0     losartan 25 MG Tabs  Commonly known as: Cozaar      Take 1 tablet (25 mg total) by mouth daily.   Quantity: 90 tablet  Refills: 0     Magnesium Glycinate 100 MG Caps      Take 800 mg by mouth in the morning and 800 mg before bedtime.   Quantity: 120 capsule  Refills: 0     sertraline 25 MG Tabs  Commonly known as: Zoloft      Take 1 tablet (25 mg total) by mouth daily.   Quantity: 30 tablet  Refills: 0            CONTINUE taking these medications        Instructions Prescription details   atorvastatin 10 MG Tabs  Commonly known as: Lipitor      Take 1 tablet (10 mg total) by mouth daily.   Refills: 0     busPIRone 5 MG Tabs  Commonly known as: Buspar      Take 1 tablet (5 mg total) by mouth in the morning and 1 tablet (5 mg total) before bedtime.   Refills: 0     CINNAMON OR      Take 1,000 mg by mouth daily.   Refills: 0     doxycycline 100 MG Caps  Commonly known as: Vibramycin      Take 1 capsule (100 mg total) by mouth 2 (two) times daily.   Refills: 0     fenofibrate 145 MG Tabs  Commonly known as: Tricor      Take 1 tablet (145 mg total) by mouth daily.   Refills: 0     gabapentin 300 MG Caps  Commonly known as: Neurontin      Take 1 capsule (300 mg total) by mouth 2 (two) times daily.   Refills: 0     HYDROcodone-acetaminophen 5-325 MG Tabs  Commonly known as: Norco      Take 2 tablets by mouth every 6 (six) hours.   Refills: 0     LORazepam 1 MG Tabs  Commonly known as:  Ativan      Take 1 tablet (1 mg total) by mouth every 4 (four) hours as needed for Anxiety.   Refills: 0     montelukast 10 MG Tabs  Commonly known as: Singulair      Take 1 tablet (10 mg total) by mouth daily.   Refills: 0     Multi-Vitamin Daily Tabs  Notes to patient: Take as directed       Take by mouth daily.   Refills: 0     pantoprazole 40 MG Tbec  Commonly known as: Protonix      Take 1 tablet (40 mg total) by mouth every morning before breakfast.   Refills: 0     PROAIR HFA IN      Inhale into the lungs as needed.   Refills: 0     Vitamin D 50 MCG (2000 UT) Caps      Take 1 capsule (2,000 Units total) by mouth daily.   Refills: 0     Xarelto 20 MG Tabs  Generic drug: rivaroxaban      Take 1 tablet (20 mg total) by mouth daily with food.   Refills: 0     zolpidem 10 MG Tabs  Commonly known as: Ambien      Take 1 tablet (10 mg total) by mouth daily.   Refills: 0            STOP taking these medications      escitalopram 20 MG Tabs  Commonly known as: Lexapro        magnesium oxide 400 MG Tabs  Commonly known as: Mag-Ox        Olmesartan Medoxomil-HCTZ 20-12.5 MG Tabs  Commonly known as: BENICAR HCT        Vitamin C 500 MG Tabs  Commonly known as: VITAMIN C                  Where to Get Your Medications        These medications were sent to [place], [place] - [address] [place] [address], [place] [address] [phone], [phone]  [address] [place] [address], [facility] [address]      Phone: [phone]   dofetilide 125 MCG Caps  losartan 25 MG Tabs  sertraline 25 MG Tabs       Please  your prescriptions at the location directed by your doctor or nurse    Bring a paper prescription for each of these medications  Magnesium Glycinate 100 MG Caps         [place] reviewed[place] n/a    Follow-up appointment:   Tee Garrido MD  [address] [place] [address]  [phone]    Go in 1 week(s)  2/5@2:30 with ez ng    [facility]  [address]  [place] [address]  [phone]  Schedule an  appointment as soon as possible for a visit on 2/3/2025  EKG at 10am on 2/3/25  follow up with APN on  at 1:30pm.    Appointments for Next 30 Days 2025 - 3/1/2025      None            Vital signs:  Temp:  [97.8 °F (36.6 °C)-98.3 °F (36.8 °C)] 97.9 °F (36.6 °C)  Pulse:  [57-76] 62  Resp:  [14-18] 18  BP: (115-140)/(63-81) 140/77  SpO2:  [91 %-97 %] 93 %    Physical Exam:    General: No acute distress   Lungs: clear to auscultation  Cardiovascular: S1, S2  Abdomen: Soft      -----------------------------------------------------------------------------------------------  PATIENT DISCHARGE INSTRUCTIONS: See electronic chart    Mercedes Tatum DO    Total time spent on discharge plannin minutes     The  [employer] Cures Act makes medical notes like these available to patients in the interest of transparency. Please be advised this is a medical document. Medical documents are intended to carry relevant information, facts as evident, and the clinical opinion of the practitioner. The medical note is intended as peer to peer communication and may appear blunt or direct. It is written in medical language and may contain abbreviations or verbiage that are unfamiliar.       Electronically signed by Mercedes Tatum DO on 2025  1:40 PM         REVIEWER COMMENTS

## 2025-02-04 NOTE — H&P
Pt seen and examined, chart reviewed. Agree with above note. No interval change in status.    Keshia Sawyer MD  [facility]  Cardiac Electrophysiolgy  [phone]    
med hx

## 2025-02-12 RX ORDER — ZINC OXIDE
OINTMENT (GRAM) TOPICAL AS NEEDED
COMMUNITY

## 2025-02-12 NOTE — PAT NURSING NOTE
Per PAT encounter/MyChart message sent to pt/took notes as well:    PreOp Instructions     You are scheduled for: a Cardiac Procedure     Date of Procedure: 02/20/25 Thursday     Diet Instructions: Do not eat or drink anything after midnight including gum, mints, candy, etc.     Medications: Medications you are allowed to take can be taken with a sip of water the morning of your procedure     Do not take the following Blood Thinner(s): Last dose of Xarelto will be Wednesday morning 2/19; Do NOT take your dose on Thursday morning 2/20.     Medications to Stop: Hold herbal supplements and vitamins starting now.     Other Medications: Last dose of Losartan will be Wednesday morning 2/19; Do NOT take your dose on Thursday morning 2/20.    Sleep Apnea: If you have sleep apnea, please bring your mask and tubing     Skin Prep : Shower with antibacterial soap using a clean washcloth, prior to procedure. Once dried off, no lotions/powders/creams/ointments, etc.     Arrival Time: The day prior to your procedure (Wednesday 2/19/25) you will receive a phone call before 6:00 pm with your arrival time. If you haven't received a phone call, please check your voicemail messages., If you did not receive a voice mail and it is after 6:00 pm, please call the nursing supervisor at [phone].    Driving After Procedure: Sedation will be given so you WILL NOT be able to drive home. You will need a responsible adult  to drive you home. You can NOT take uber or taxi unless approved by your physician in advance.     Discharge Teaching: Your nurse will give you specific instructions before discharge, Most people can resume normal activities in 2-3 days, Any questions, please call the physician's office     [employer] parking is available starting at 6 am or park in the [employer] parking garage at [facility]. Check in at the [facility] reception desk. Our  will be there to check you in for your procedure.  Please bring your insurance cards and ID with you.                                                                                                                                      Please DO NOT respond to this message, the inbasket is not monitored for messages. For any questions, please call the physician's office.    Of note: Pt stated rash near groin but MD aware; per pt, MD stated that vessel is more off to the side so okay to proceed.

## 2025-02-20 ENCOUNTER — HOSPITAL ENCOUNTER (OUTPATIENT)
Dept: INTERVENTIONAL RADIOLOGY/VASCULAR | Facility: HOSPITAL | Age: 65
Discharge: HOME | End: 2025-02-21
Attending: INTERNAL MEDICINE | Admitting: INTERNAL MEDICINE
Payer: MEDICARE

## 2025-02-20 ENCOUNTER — APPOINTMENT (OUTPATIENT)
Dept: GENERAL RADIOLOGY | Facility: HOSPITAL | Age: 65
End: 2025-02-20
Attending: INTERNAL MEDICINE
Payer: MEDICARE

## 2025-02-20 ENCOUNTER — ANESTHESIA (OUTPATIENT)
Dept: INTERVENTIONAL RADIOLOGY/VASCULAR | Facility: HOSPITAL | Age: 65
End: 2025-02-20
Payer: MEDICARE

## 2025-02-20 DIAGNOSIS — I48.91 A-FIB (HCC): ICD-10-CM

## 2025-02-20 DIAGNOSIS — E87.6 HYPOKALEMIA: Primary | ICD-10-CM

## 2025-02-20 LAB
ISTAT ACTIVATED CLOTTING TIME: 233 SECONDS (ref 74–137)
ISTAT ACTIVATED CLOTTING TIME: 239 SECONDS (ref 74–137)

## 2025-02-20 PROCEDURE — 85347 COAGULATION TIME ACTIVATED: CPT

## 2025-02-20 PROCEDURE — 4A023FZ MEASUREMENT OF CARDIAC RHYTHM, PERCUTANEOUS APPROACH: ICD-10-PCS | Performed by: INTERNAL MEDICINE

## 2025-02-20 PROCEDURE — B246ZZZ ULTRASONOGRAPHY OF RIGHT AND LEFT HEART: ICD-10-PCS | Performed by: INTERNAL MEDICINE

## 2025-02-20 PROCEDURE — B24CZZZ ULTRASONOGRAPHY OF PERICARDIUM: ICD-10-PCS | Performed by: INTERNAL MEDICINE

## 2025-02-20 PROCEDURE — 02583ZZ DESTRUCTION OF CONDUCTION MECHANISM, PERCUTANEOUS APPROACH: ICD-10-PCS | Performed by: INTERNAL MEDICINE

## 2025-02-20 PROCEDURE — 93656 COMPRE EP EVAL ABLTJ ATR FIB: CPT | Performed by: INTERNAL MEDICINE

## 2025-02-20 PROCEDURE — 4A0234Z MEASUREMENT OF CARDIAC ELECTRICAL ACTIVITY, PERCUTANEOUS APPROACH: ICD-10-PCS | Performed by: INTERNAL MEDICINE

## 2025-02-20 PROCEDURE — 71045 X-RAY EXAM CHEST 1 VIEW: CPT | Performed by: INTERNAL MEDICINE

## 2025-02-20 PROCEDURE — 02K83ZZ MAP CONDUCTION MECHANISM, PERCUTANEOUS APPROACH: ICD-10-PCS | Performed by: INTERNAL MEDICINE

## 2025-02-20 RX ORDER — SODIUM CHLORIDE, SODIUM LACTATE, POTASSIUM CHLORIDE, CALCIUM CHLORIDE 600; 310; 30; 20 MG/100ML; MG/100ML; MG/100ML; MG/100ML
INJECTION, SOLUTION INTRAVENOUS CONTINUOUS PRN
Status: DISCONTINUED | OUTPATIENT
Start: 2025-02-20 | End: 2025-02-20 | Stop reason: SURG

## 2025-02-20 RX ORDER — ONDANSETRON 2 MG/ML
4 INJECTION INTRAMUSCULAR; INTRAVENOUS EVERY 6 HOURS PRN
Status: DISCONTINUED | OUTPATIENT
Start: 2025-02-20 | End: 2025-02-20 | Stop reason: HOSPADM

## 2025-02-20 RX ORDER — FENOFIBRATE 134 MG/1
134 CAPSULE ORAL
Status: DISCONTINUED | OUTPATIENT
Start: 2025-02-21 | End: 2025-02-21

## 2025-02-20 RX ORDER — HYDROMORPHONE HYDROCHLORIDE 1 MG/ML
0.4 INJECTION, SOLUTION INTRAMUSCULAR; INTRAVENOUS; SUBCUTANEOUS EVERY 5 MIN PRN
Status: DISCONTINUED | OUTPATIENT
Start: 2025-02-20 | End: 2025-02-20 | Stop reason: HOSPADM

## 2025-02-20 RX ORDER — ATORVASTATIN CALCIUM 10 MG/1
10 TABLET, FILM COATED ORAL DAILY
Status: DISCONTINUED | OUTPATIENT
Start: 2025-02-21 | End: 2025-02-21

## 2025-02-20 RX ORDER — HEPARIN SODIUM 5000 [USP'U]/ML
INJECTION, SOLUTION INTRAVENOUS; SUBCUTANEOUS
Status: COMPLETED
Start: 2025-02-20 | End: 2025-02-20

## 2025-02-20 RX ORDER — DOXYCYCLINE 100 MG/1
100 CAPSULE ORAL 2 TIMES DAILY
Status: DISCONTINUED | OUTPATIENT
Start: 2025-02-20 | End: 2025-02-21

## 2025-02-20 RX ORDER — ACETAMINOPHEN 325 MG/1
650 TABLET ORAL EVERY 6 HOURS PRN
Status: DISCONTINUED | OUTPATIENT
Start: 2025-02-20 | End: 2025-02-21

## 2025-02-20 RX ORDER — GLYCOPYRROLATE 0.2 MG/ML
INJECTION, SOLUTION INTRAMUSCULAR; INTRAVENOUS AS NEEDED
Status: DISCONTINUED | OUTPATIENT
Start: 2025-02-20 | End: 2025-02-20 | Stop reason: SURG

## 2025-02-20 RX ORDER — LIDOCAINE HYDROCHLORIDE 10 MG/ML
INJECTION, SOLUTION EPIDURAL; INFILTRATION; INTRACAUDAL; PERINEURAL AS NEEDED
Status: DISCONTINUED | OUTPATIENT
Start: 2025-02-20 | End: 2025-02-20 | Stop reason: SURG

## 2025-02-20 RX ORDER — ONDANSETRON 2 MG/ML
INJECTION INTRAMUSCULAR; INTRAVENOUS
Status: COMPLETED
Start: 2025-02-20 | End: 2025-02-20

## 2025-02-20 RX ORDER — SERTRALINE HYDROCHLORIDE 25 MG/1
25 TABLET, FILM COATED ORAL DAILY
Status: DISCONTINUED | OUTPATIENT
Start: 2025-02-21 | End: 2025-02-21

## 2025-02-20 RX ORDER — SODIUM CHLORIDE 9 MG/ML
INJECTION, SOLUTION INTRAVENOUS
Status: COMPLETED | OUTPATIENT
Start: 2025-02-20 | End: 2025-02-20

## 2025-02-20 RX ORDER — HYDROMORPHONE HYDROCHLORIDE 1 MG/ML
0.6 INJECTION, SOLUTION INTRAMUSCULAR; INTRAVENOUS; SUBCUTANEOUS EVERY 5 MIN PRN
Status: DISCONTINUED | OUTPATIENT
Start: 2025-02-20 | End: 2025-02-20 | Stop reason: HOSPADM

## 2025-02-20 RX ORDER — METOCLOPRAMIDE HYDROCHLORIDE 5 MG/ML
10 INJECTION INTRAMUSCULAR; INTRAVENOUS EVERY 8 HOURS PRN
Status: DISCONTINUED | OUTPATIENT
Start: 2025-02-20 | End: 2025-02-20 | Stop reason: HOSPADM

## 2025-02-20 RX ORDER — ACETAMINOPHEN 325 MG/1
TABLET ORAL
Status: COMPLETED
Start: 2025-02-20 | End: 2025-02-20

## 2025-02-20 RX ORDER — PROTAMINE SULFATE 10 MG/ML
INJECTION, SOLUTION INTRAVENOUS AS NEEDED
Status: DISCONTINUED | OUTPATIENT
Start: 2025-02-20 | End: 2025-02-20 | Stop reason: SURG

## 2025-02-20 RX ORDER — HYDROCODONE BITARTRATE AND ACETAMINOPHEN 5; 325 MG/1; MG/1
2 TABLET ORAL EVERY 6 HOURS PRN
Status: DISCONTINUED | OUTPATIENT
Start: 2025-02-20 | End: 2025-02-21

## 2025-02-20 RX ORDER — ROCURONIUM BROMIDE 10 MG/ML
INJECTION, SOLUTION INTRAVENOUS AS NEEDED
Status: DISCONTINUED | OUTPATIENT
Start: 2025-02-20 | End: 2025-02-20 | Stop reason: SURG

## 2025-02-20 RX ORDER — SODIUM CHLORIDE, SODIUM LACTATE, POTASSIUM CHLORIDE, CALCIUM CHLORIDE 600; 310; 30; 20 MG/100ML; MG/100ML; MG/100ML; MG/100ML
INJECTION, SOLUTION INTRAVENOUS CONTINUOUS
Status: DISCONTINUED | OUTPATIENT
Start: 2025-02-20 | End: 2025-02-20 | Stop reason: HOSPADM

## 2025-02-20 RX ORDER — ZOLPIDEM TARTRATE 10 MG/1
10 TABLET ORAL NIGHTLY
Status: DISCONTINUED | OUTPATIENT
Start: 2025-02-20 | End: 2025-02-21

## 2025-02-20 RX ORDER — METOCLOPRAMIDE HYDROCHLORIDE 5 MG/ML
INJECTION INTRAMUSCULAR; INTRAVENOUS
Status: COMPLETED
Start: 2025-02-20 | End: 2025-02-20

## 2025-02-20 RX ORDER — NEOSTIGMINE METHYLSULFATE 1 MG/ML
INJECTION INTRAVENOUS AS NEEDED
Status: DISCONTINUED | OUTPATIENT
Start: 2025-02-20 | End: 2025-02-20 | Stop reason: SURG

## 2025-02-20 RX ORDER — ONDANSETRON 2 MG/ML
INJECTION INTRAMUSCULAR; INTRAVENOUS AS NEEDED
Status: DISCONTINUED | OUTPATIENT
Start: 2025-02-20 | End: 2025-02-20 | Stop reason: SURG

## 2025-02-20 RX ORDER — LIDOCAINE HYDROCHLORIDE AND EPINEPHRINE 10; 10 MG/ML; UG/ML
INJECTION, SOLUTION INFILTRATION; PERINEURAL
Status: COMPLETED
Start: 2025-02-20 | End: 2025-02-20

## 2025-02-20 RX ORDER — MONTELUKAST SODIUM 10 MG/1
10 TABLET ORAL DAILY
Status: DISCONTINUED | OUTPATIENT
Start: 2025-02-21 | End: 2025-02-21

## 2025-02-20 RX ORDER — NALOXONE HYDROCHLORIDE 0.4 MG/ML
0.08 INJECTION, SOLUTION INTRAMUSCULAR; INTRAVENOUS; SUBCUTANEOUS AS NEEDED
Status: DISCONTINUED | OUTPATIENT
Start: 2025-02-20 | End: 2025-02-20 | Stop reason: HOSPADM

## 2025-02-20 RX ORDER — BUSPIRONE HYDROCHLORIDE 5 MG/1
5 TABLET ORAL 2 TIMES DAILY
Status: DISCONTINUED | OUTPATIENT
Start: 2025-02-20 | End: 2025-02-21

## 2025-02-20 RX ORDER — DOFETILIDE 0.12 MG/1
125 CAPSULE ORAL EVERY 12 HOURS
Status: DISCONTINUED | OUTPATIENT
Start: 2025-02-20 | End: 2025-02-21

## 2025-02-20 RX ORDER — LOSARTAN POTASSIUM 25 MG/1
25 TABLET ORAL DAILY
Status: DISCONTINUED | OUTPATIENT
Start: 2025-02-21 | End: 2025-02-21

## 2025-02-20 RX ORDER — GABAPENTIN 300 MG/1
300 CAPSULE ORAL 2 TIMES DAILY
Status: DISCONTINUED | OUTPATIENT
Start: 2025-02-20 | End: 2025-02-21

## 2025-02-20 RX ORDER — HEPARIN SODIUM 1000 [USP'U]/ML
INJECTION, SOLUTION INTRAVENOUS; SUBCUTANEOUS
Status: COMPLETED
Start: 2025-02-20 | End: 2025-02-20

## 2025-02-20 RX ORDER — DEXAMETHASONE SODIUM PHOSPHATE 4 MG/ML
VIAL (ML) INJECTION AS NEEDED
Status: DISCONTINUED | OUTPATIENT
Start: 2025-02-20 | End: 2025-02-20 | Stop reason: SURG

## 2025-02-20 RX ORDER — PROTAMINE SULFATE 10 MG/ML
INJECTION, SOLUTION INTRAVENOUS
Status: COMPLETED
Start: 2025-02-20 | End: 2025-02-20

## 2025-02-20 RX ORDER — HYDROMORPHONE HYDROCHLORIDE 1 MG/ML
0.2 INJECTION, SOLUTION INTRAMUSCULAR; INTRAVENOUS; SUBCUTANEOUS EVERY 5 MIN PRN
Status: DISCONTINUED | OUTPATIENT
Start: 2025-02-20 | End: 2025-02-20 | Stop reason: HOSPADM

## 2025-02-20 RX ORDER — HEPARIN SODIUM 5000 [USP'U]/ML
INJECTION, SOLUTION INTRAVENOUS; SUBCUTANEOUS AS NEEDED
Status: DISCONTINUED | OUTPATIENT
Start: 2025-02-20 | End: 2025-02-20 | Stop reason: SURG

## 2025-02-20 RX ORDER — PANTOPRAZOLE SODIUM 40 MG/1
40 TABLET, DELAYED RELEASE ORAL
Status: DISCONTINUED | OUTPATIENT
Start: 2025-02-21 | End: 2025-02-21

## 2025-02-20 RX ADMIN — ACETAMINOPHEN 650 MG: 325 TABLET ORAL at 20:28:00

## 2025-02-20 RX ADMIN — DOFETILIDE 125 MCG: 0.12 CAPSULE ORAL at 22:33:00

## 2025-02-20 RX ADMIN — SODIUM CHLORIDE, SODIUM LACTATE, POTASSIUM CHLORIDE, CALCIUM CHLORIDE: 600; 310; 30; 20 INJECTION, SOLUTION INTRAVENOUS at 16:30:00

## 2025-02-20 RX ADMIN — LIDOCAINE HYDROCHLORIDE 50 MG: 10 INJECTION, SOLUTION EPIDURAL; INFILTRATION; INTRACAUDAL; PERINEURAL at 13:37:00

## 2025-02-20 RX ADMIN — METOCLOPRAMIDE HYDROCHLORIDE 10 MG: 5 INJECTION INTRAMUSCULAR; INTRAVENOUS at 15:56:00

## 2025-02-20 RX ADMIN — ACETAMINOPHEN 650 MG: 325 TABLET ORAL at 12:52:00

## 2025-02-20 RX ADMIN — DOXYCYCLINE 100 MG: 100 CAPSULE ORAL at 20:28:00

## 2025-02-20 RX ADMIN — PROTAMINE SULFATE 50 MG: 10 INJECTION, SOLUTION INTRAVENOUS at 15:05:00

## 2025-02-20 RX ADMIN — SODIUM CHLORIDE, SODIUM LACTATE, POTASSIUM CHLORIDE, CALCIUM CHLORIDE: 600; 310; 30; 20 INJECTION, SOLUTION INTRAVENOUS at 13:30:00

## 2025-02-20 RX ADMIN — ROCURONIUM BROMIDE 10 MG: 10 INJECTION, SOLUTION INTRAVENOUS at 14:50:00

## 2025-02-20 RX ADMIN — SODIUM CHLORIDE: 9 INJECTION, SOLUTION INTRAVENOUS at 10:45:00

## 2025-02-20 RX ADMIN — ROCURONIUM BROMIDE 10 MG: 10 INJECTION, SOLUTION INTRAVENOUS at 14:11:00

## 2025-02-20 RX ADMIN — HEPARIN SODIUM 7000 UNITS: 5000 INJECTION, SOLUTION INTRAVENOUS; SUBCUTANEOUS at 14:55:00

## 2025-02-20 RX ADMIN — GLYCOPYRROLATE 0.8 MG: 0.2 INJECTION, SOLUTION INTRAMUSCULAR; INTRAVENOUS at 15:05:00

## 2025-02-20 RX ADMIN — ROCURONIUM BROMIDE 50 MG: 10 INJECTION, SOLUTION INTRAVENOUS at 13:37:00

## 2025-02-20 RX ADMIN — NEOSTIGMINE METHYLSULFATE 4.5 MG: 1 INJECTION INTRAVENOUS at 15:05:00

## 2025-02-20 RX ADMIN — BUSPIRONE HYDROCHLORIDE 5 MG: 5 TABLET ORAL at 20:28:00

## 2025-02-20 RX ADMIN — ZOLPIDEM TARTRATE 10 MG: 10 TABLET ORAL at 20:28:00

## 2025-02-20 RX ADMIN — ONDANSETRON 4 MG: 2 INJECTION INTRAMUSCULAR; INTRAVENOUS at 15:35:00

## 2025-02-20 RX ADMIN — GABAPENTIN 300 MG: 300 CAPSULE ORAL at 20:28:00

## 2025-02-20 RX ADMIN — HEPARIN SODIUM 5000 UNITS: 5000 INJECTION, SOLUTION INTRAVENOUS; SUBCUTANEOUS at 14:10:00

## 2025-02-20 RX ADMIN — DEXAMETHASONE SODIUM PHOSPHATE 4 MG: 4 MG/ML VIAL (ML) INJECTION at 13:47:00

## 2025-02-20 RX ADMIN — HEPARIN SODIUM 5000 UNITS: 5000 INJECTION, SOLUTION INTRAVENOUS; SUBCUTANEOUS at 14:29:00

## 2025-02-20 RX ADMIN — HEPARIN SODIUM 10000 UNITS: 5000 INJECTION, SOLUTION INTRAVENOUS; SUBCUTANEOUS at 13:47:00

## 2025-02-20 RX ADMIN — ONDANSETRON 4 MG: 2 INJECTION INTRAMUSCULAR; INTRAVENOUS at 15:05:00

## 2025-02-20 NOTE — ANESTHESIA POSTPROCEDURE EVALUATION
[facility]    Veronika Nash Patient Status:  Outpatient   Age/Gender 65 year old female MRN [id number]   Location [facility] POST ANESTHESIA CARE UNIT Attending Keshia Sawyer MD   Hosp Day # 0 PCP Tee Garrido MD       Anesthesia Post-op Note        Procedure Summary       Date: 02/20/25 Room / Location: [facility] Interventional Suites    Anesthesia Start: 1330 Anesthesia Stop: 1529    Procedure: CATH EP Diagnosis:       A-fib (HCC)      A-fib (HCC)    Scheduled Providers: Gal Goddard MD Anesthesiologist: Gal Goddard MD    Anesthesia Type: general ASA Status: Not recorded            Anesthesia Type: general    Vitals Value Taken Time   /70 02/20/25 1541   Temp 97.2 °F (36.2 °C) 02/20/25 1526   Pulse 76 02/20/25 1545   Resp 25 02/20/25 1545   SpO2 95 % 02/20/25 1545   Vitals shown include unfiled device data.    PVI/POSS PWI    Patient Location: PACU    Anesthesia Type: general    Airway Patency: patent    Postop Pain Control: adequate    Mental Status: preanesthetic baseline    Nausea/Vomiting: none    Cardiopulmonary/Hydration status: stable euvolemic    Complications: no apparent anesthesia related complications    Postop vital signs: stable    Dental Exam: Unchanged from Preop    Patient to be discharged from PACU when criteria met.

## 2025-02-20 NOTE — ANESTHESIA PREPROCEDURE EVALUATION
PRE-OP EVALUATION    Patient Name: Veronika Nash    Admit Diagnosis: A-fib (HCC) [I48.91]    Pre-op Diagnosis: * No pre-op diagnosis entered *        Anesthesia Procedure: CATH EP    * No surgeons found in log *    Pre-op vitals reviewed.  Temp: 97.3 °F (36.3 °C)  Pulse: 65  Resp: 21  BP: 170/85  SpO2: 95 %  Body mass index is 34.46 kg/m².    Current medications reviewed.  Hospital Medications:   sodium chloride 0.9% infusion   Intravenous On Call    [COMPLETED] lidocaine-EPINEPHrine (Xylocaine-Epinephrine) 1 %-1:227913 injection           Outpatient Medications:   Prescriptions Prior to Admission[1]    Allergies: Bactrim [sulfamethoxazole w/trimethoprim, co-trimoxazole] and Sulfa antibiotics      Anesthesia Evaluation    Patient summary reviewed.    Anesthetic Complications  (-) history of anesthetic complications         GI/Hepatic/Renal      (+) GERD                           Cardiovascular      ECG reviewed.  Exercise tolerance: good     MET: >4      (+) hypertension   (+) hyperlipidemia                                  Endo/Other                                  Pulmonary      (+) asthma         (+) shortness of breath     (+) sleep apnea       Neuro/Psych                                      Past Surgical History:   Procedure Laterality Date    Appendectomy      Appendectomy      Back surgery      L4-5 Laminectomy    Cholecystectomy      Colonoscopy      Cyst aspiration left      Cyst aspiration right      [place] biopsy stereo nodule 1 site left (cpt=[id number]) Left 03/31/2022    bn    Needle biopsy left  03/2022    Columnar change    Other surgical history      Exploratory surgery from internal bleeding at age 16 for benign tumor     Social History     Socioeconomic History    Marital status:    Tobacco Use    Smoking status: Former     Current packs/day: 0.00     Average packs/day: 0.5 packs/day for 28.0 years (14.0 ttl pk-yrs)     Types: Cigarettes     Start date: 1995     Quit date: 2023     Years since  quittin.1    Smokeless tobacco: Never   Vaping Use    Vaping status: Never Used   Substance and Sexual Activity    Alcohol use: Not Currently     Comment: few glasses of wine a month    Drug use: No     History   Drug Use No     Available pre-op labs reviewed.  Lab Results   Component Value Date    WBC 7.7 2025    RBC 4.56 2025    HGB 14.6 2025    HCT 43.6 2025    MCV 95.6 2025    MCH 32.0 2025    MCHC 33.5 2025    RDW 13.4 2025    .0 2025     Lab Results   Component Value Date     2025    K 4.4 2025     2025    CO2 30.0 2025    BUN 16 2025    CREATSERUM 0.95 2025     (H) 2025    CA 9.7 2025            Airway      Mallampati: II  Mouth opening: 3 FB  TM distance: 4 - 6 cm  Neck ROM: full Cardiovascular      Rhythm: regular  Rate: normal     Dental             Pulmonary      Breath sounds clear to auscultation bilaterally.               Other findings              ASA: 2   Plan: general  NPO status verified and patient meets guidelines.  Patient has not taken beta blockers in last 24 hours.  Post-procedure pain management plan discussed with surgeon and patient.      Plan/risks discussed with: patient                Present on Admission:  **None**             [1]   Medications Prior to Admission   Medication Sig Dispense Refill Last Dose/Taking    liver oil-zinc oxide 40 % External Ointment Apply topically as needed for Dry Skin.   2025    sertraline 25 MG Oral Tab Take 1 tablet (25 mg total) by mouth daily. 30 tablet 0 2025 Morning    Magnesium Glycinate 100 MG Oral Cap Take 800 mg by mouth in the morning and 800 mg before bedtime. (Patient taking differently: Take 200 mg by mouth in the morning and 200 mg before bedtime. (200mg tablet).) 120 capsule 0 Past Week    dofetilide 125 MCG Oral Cap Take 1 capsule (125 mcg total) by mouth every 12 (twelve) hours. 60 capsule 0  2/20/2025 Morning    losartan 25 MG Oral Tab Take 1 tablet (25 mg total) by mouth daily. (Patient taking differently: Take 1 tablet (25 mg total) by mouth every morning.) 90 tablet 0 2/19/2025    doxycycline 100 MG Oral Cap Take 1 capsule (100 mg total) by mouth 2 (two) times daily.   2/19/2025    atorvastatin 10 MG Oral Tab Take 1 tablet (10 mg total) by mouth daily.   2/20/2025 Morning    busPIRone 5 MG Oral Tab Take 1 tablet (5 mg total) by mouth in the morning and 1 tablet (5 mg total) before bedtime.   2/20/2025 Morning    HYDROcodone-acetaminophen 5-325 MG Oral Tab Take 2 tablets by mouth every 6 (six) hours as needed for Pain.   2/19/2025    montelukast 10 MG Oral Tab Take 1 tablet (10 mg total) by mouth daily.   2/19/2025    XARELTO 20 MG Oral Tab Take 1 tablet (20 mg total) by mouth every morning.   2/19/2025    zolpidem 10 MG Oral Tab Take 1 tablet (10 mg total) by mouth daily.   2/19/2025    Multiple Vitamin (MULTI-VITAMIN DAILY) Oral Tab Take by mouth daily.   Past Week    Cholecalciferol (VITAMIN D) 2000 units Oral Cap Take 1 capsule (2,000 Units total) by mouth daily.   Past Month    CINNAMON OR Take 1,000 mg by mouth daily.   Past Week    gabapentin 300 MG Oral Cap Take 1 capsule (300 mg total) by mouth 2 (two) times daily.   2/20/2025 Morning    Fenofibrate 145 MG Oral Tab Take 1 tablet (145 mg total) by mouth daily.   2/20/2025 Morning    Pantoprazole Sodium 40 MG Oral Tab EC Take 1 tablet (40 mg total) by mouth every morning before breakfast.   2/20/2025 Morning    LORazepam 1 MG Oral Tab Take 1 tablet (1 mg total) by mouth every 4 (four) hours as needed for Anxiety.   2/19/2025    Albuterol Sulfate (PROAIR HFA IN) Inhale into the lungs as needed.     2/20/2025 Morning

## 2025-02-20 NOTE — ANESTHESIA PROCEDURE NOTES
Airway  Date/Time: 2/20/2025 1:39 PM  Urgency: elective      General Information and Staff    Patient location during procedure: OR  Anesthesiologist: Gal Goddard MD  Performed: anesthesiologist   Performed by: Gal Goddard MD  Authorized by: Gal Goddard MD      Indications and Patient Condition  Indications for airway management: anesthesia  Sedation level: deep  Preoxygenated: yes  Patient position: sniffing  Mask difficulty assessment: 1 - vent by mask    Final Airway Details  Final airway type: endotracheal airway      Successful airway: ETT  Cuffed: yes   Successful intubation technique: direct laryngoscopy  Endotracheal tube insertion site: oral  Blade: Luis  Blade size: #3  ETT size (mm): 7.0    Cormack-Lehane Classification: grade IIA - partial view of glottis  Placement verified by: capnometry   Measured from: lips  Number of attempts at approach: 1

## 2025-02-21 VITALS
WEIGHT: 227.06 LBS | DIASTOLIC BLOOD PRESSURE: 74 MMHG | SYSTOLIC BLOOD PRESSURE: 135 MMHG | OXYGEN SATURATION: 95 % | RESPIRATION RATE: 15 BRPM | HEART RATE: 67 BPM | BODY MASS INDEX: 35.64 KG/M2 | HEIGHT: 67 IN | TEMPERATURE: 98 F

## 2025-02-21 PROBLEM — I48.91 A-FIB (HCC): Status: ACTIVE | Noted: 2025-02-21

## 2025-02-21 LAB
ALBUMIN SERPL-MCNC: 4.5 G/DL (ref 3.2–4.8)
ALBUMIN/GLOB SERPL: 1.8 {RATIO} (ref 1–2)
ALP LIVER SERPL-CCNC: 58 U/L
ALT SERPL-CCNC: 36 U/L
ANION GAP SERPL CALC-SCNC: 8 MMOL/L (ref 0–18)
AST SERPL-CCNC: 49 U/L (ref ?–34)
ATRIAL RATE: 68 BPM
BASOPHILS # BLD AUTO: 0.05 X10(3) UL (ref 0–0.2)
BASOPHILS NFR BLD AUTO: 0.5 %
BILIRUB SERPL-MCNC: 0.5 MG/DL (ref 0.2–1.1)
BUN BLD-MCNC: 12 MG/DL (ref 9–23)
CALCIUM BLD-MCNC: 9.1 MG/DL (ref 8.7–10.6)
CHLORIDE SERPL-SCNC: 101 MMOL/L (ref 98–112)
CO2 SERPL-SCNC: 30 MMOL/L (ref 21–32)
CREAT BLD-MCNC: 0.99 MG/DL
EGFRCR SERPLBLD CKD-EPI 2021: 63 ML/MIN/1.73M2 (ref 60–?)
EOSINOPHIL # BLD AUTO: 0.05 X10(3) UL (ref 0–0.7)
EOSINOPHIL NFR BLD AUTO: 0.5 %
ERYTHROCYTE [DISTWIDTH] IN BLOOD BY AUTOMATED COUNT: 13.3 %
FLUAV + FLUBV RNA SPEC NAA+PROBE: NEGATIVE
FLUAV + FLUBV RNA SPEC NAA+PROBE: NEGATIVE
GLOBULIN PLAS-MCNC: 2.5 G/DL (ref 2–3.5)
GLUCOSE BLD-MCNC: 100 MG/DL (ref 70–99)
HCT VFR BLD AUTO: 36.8 %
HGB BLD-MCNC: 12.4 G/DL
IMM GRANULOCYTES # BLD AUTO: 0.04 X10(3) UL (ref 0–1)
IMM GRANULOCYTES NFR BLD: 0.4 %
LYMPHOCYTES # BLD AUTO: 3.68 X10(3) UL (ref 1–4)
LYMPHOCYTES NFR BLD AUTO: 33.2 %
MCH RBC QN AUTO: 31.8 PG (ref 26–34)
MCHC RBC AUTO-ENTMCNC: 33.7 G/DL (ref 31–37)
MCV RBC AUTO: 94.4 FL
MONOCYTES # BLD AUTO: 0.79 X10(3) UL (ref 0.1–1)
MONOCYTES NFR BLD AUTO: 7.1 %
NEUTROPHILS # BLD AUTO: 6.49 X10 (3) UL (ref 1.5–7.7)
NEUTROPHILS # BLD AUTO: 6.49 X10(3) UL (ref 1.5–7.7)
NEUTROPHILS NFR BLD AUTO: 58.3 %
OSMOLALITY SERPL CALC.SUM OF ELEC: 288 MOSM/KG (ref 275–295)
P AXIS: 53 DEGREES
P-R INTERVAL: 214 MS
PLATELET # BLD AUTO: 234 10(3)UL (ref 150–450)
POTASSIUM SERPL-SCNC: 3.5 MMOL/L (ref 3.5–5.1)
PROT SERPL-MCNC: 7 G/DL (ref 5.7–8.2)
Q-T INTERVAL: 410 MS
QRS DURATION: 90 MS
QTC CALCULATION (BEZET): 435 MS
R AXIS: -41 DEGREES
RBC # BLD AUTO: 3.9 X10(6)UL
RSV RNA SPEC NAA+PROBE: NEGATIVE
SARS-COV-2 RNA RESP QL NAA+PROBE: NOT DETECTED
SODIUM SERPL-SCNC: 139 MMOL/L (ref 136–145)
T AXIS: 19 DEGREES
VENTRICULAR RATE: 68 BPM
WBC # BLD AUTO: 11.1 X10(3) UL (ref 4–11)

## 2025-02-21 PROCEDURE — 94760 N-INVAS EAR/PLS OXIMETRY 1: CPT

## 2025-02-21 PROCEDURE — 85025 COMPLETE CBC W/AUTO DIFF WBC: CPT | Performed by: PHYSICIAN ASSISTANT

## 2025-02-21 PROCEDURE — 93010 ELECTROCARDIOGRAM REPORT: CPT | Performed by: INTERNAL MEDICINE

## 2025-02-21 PROCEDURE — 93005 ELECTROCARDIOGRAM TRACING: CPT

## 2025-02-21 PROCEDURE — 0241U SARS-COV-2/FLU A AND B/RSV BY PCR (GENEXPERT): CPT | Performed by: PHYSICIAN ASSISTANT

## 2025-02-21 PROCEDURE — 80053 COMPREHEN METABOLIC PANEL: CPT | Performed by: PHYSICIAN ASSISTANT

## 2025-02-21 RX ORDER — FUROSEMIDE 10 MG/ML
20 INJECTION INTRAMUSCULAR; INTRAVENOUS ONCE
Status: COMPLETED | OUTPATIENT
Start: 2025-02-21 | End: 2025-02-21

## 2025-02-21 RX ORDER — HYDROCODONE BITARTRATE AND ACETAMINOPHEN 5; 325 MG/1; MG/1
1 TABLET ORAL EVERY 6 HOURS PRN
Status: DISCONTINUED | OUTPATIENT
Start: 2025-02-21 | End: 2025-02-21

## 2025-02-21 RX ORDER — POTASSIUM CHLORIDE 1.5 G/1.58G
40 POWDER, FOR SOLUTION ORAL ONCE
Status: COMPLETED | OUTPATIENT
Start: 2025-02-21 | End: 2025-02-21

## 2025-02-21 RX ADMIN — POTASSIUM CHLORIDE 40 MEQ: 1.5 POWDER, FOR SOLUTION ORAL at 13:28:00

## 2025-02-21 RX ADMIN — LOSARTAN POTASSIUM 25 MG: 25 TABLET ORAL at 08:29:00

## 2025-02-21 RX ADMIN — ACETAMINOPHEN 650 MG: 325 TABLET ORAL at 08:30:00

## 2025-02-21 RX ADMIN — BUSPIRONE HYDROCHLORIDE 5 MG: 5 TABLET ORAL at 08:29:00

## 2025-02-21 RX ADMIN — DOXYCYCLINE 100 MG: 100 CAPSULE ORAL at 08:30:00

## 2025-02-21 RX ADMIN — DOFETILIDE 125 MCG: 0.12 CAPSULE ORAL at 10:19:00

## 2025-02-21 RX ADMIN — MONTELUKAST SODIUM 10 MG: 10 TABLET ORAL at 08:29:00

## 2025-02-21 RX ADMIN — GABAPENTIN 300 MG: 300 CAPSULE ORAL at 08:30:00

## 2025-02-21 RX ADMIN — ATORVASTATIN CALCIUM 10 MG: 10 TABLET, FILM COATED ORAL at 08:29:00

## 2025-02-21 RX ADMIN — SERTRALINE HYDROCHLORIDE 25 MG: 25 TABLET, FILM COATED ORAL at 08:29:00

## 2025-02-21 RX ADMIN — FUROSEMIDE 20 MG: 10 INJECTION INTRAMUSCULAR; INTRAVENOUS at 10:13:00

## 2025-02-21 RX ADMIN — PANTOPRAZOLE SODIUM 40 MG: 40 TABLET, DELAYED RELEASE ORAL at 06:19:00

## 2025-02-21 RX ADMIN — HYDROCODONE BITARTRATE AND ACETAMINOPHEN 1 TABLET: 5; 325 TABLET ORAL at 10:13:00

## 2025-02-21 RX ADMIN — FENOFIBRATE 134 MG: 134 CAPSULE ORAL at 08:30:00

## 2025-02-21 NOTE — PLAN OF CARE
NURSING DISCHARGE NOTE    Pt ambulated on room air. Cleared for dsicharge. Potassium replaced per orders. Discharged Home via Wheelchair.  Accompanied by Support staff  Belongings Taken by patient/family.    Tele and IV discontinued without any complications. Pt provided with discharge paperwork and educated on discharge instructions including medication changes and follow up appointments. Post ablation education reviewed. All questions answered. Pt transferred to [facility].

## 2025-02-21 NOTE — PROGRESS NOTES
02/20/25 1941 02/20/25 1943 02/20/25 1945   Vital Signs   /81 158/85 (!) 156/91   MAP (mmHg) (!) 102 (!) 105 (!) 111   BP Location Left arm Left arm Left arm   BP Method Automatic Automatic Automatic   Patient Position Lying Sitting Standing     Orthostatic BP (-)

## 2025-02-21 NOTE — PROGRESS NOTES
Pt received s/p RFA with Dr. Sawyer from PACU. Right femoral venous access sites closed with vascade x3. Site CDI, no bleeding or hematomas present. Dr. Sawyer spoke with pt's family at bedside post procedure. Pt unable to keep O2 sats >90% on RA post procedure. Dr. Sawyer notified, orders received to admit pt overnight and PCXR.     Report called to EDDIE Addison on CTU 2. Pt transferred to [facility] at 1845

## 2025-02-21 NOTE — PLAN OF CARE
Assumed care of patient at 0700. Pt A/Ox 4. O2 sats maintained on room air. NSR with 1st degree AVB on tele. Last BM 2/20. Voiding without difficulty. Pt reports chronic back pain and headache, managed with PRN medications. Pt up independently, instructed to call with any changes. Pt updated on plan of care. Care needs met. Bed in lowest position, Call light within reach. Right groin incision clean/dry/ soft with no hematoma.     POC: COVID/FLU/RSV pending, ambulate, possible discharge     Problem: Patient/Family Goals  Goal: Patient/Family Long Term Goal  Description: Patient's Long Term Goal: Stay out of hospital    Interventions:  - Follow up with PCP after discharge  - Take medication as prescribed  - See additional Care Plan goals for specific interventions  Outcome: Progressing  Goal: Patient/Family Short Term Goal  Description: Patient's Short Term Goal: Breathe better and go home    Interventions:   - Test ordered  - Monitor O2 needs  - See additional Care Plan goals for specific interventions  Outcome: Progressing     Problem: CARDIOVASCULAR - ADULT  Goal: Maintains optimal cardiac output and hemodynamic stability  Description: INTERVENTIONS:  - Monitor vital signs, rhythm, and trends  - Monitor for bleeding, hypotension and signs of decreased cardiac output  - Evaluate effectiveness of vasoactive medications to optimize hemodynamic stability  - Monitor arterial and/or venous puncture sites for bleeding and/or hematoma  - Assess quality of pulses, skin color and temperature  - Assess for signs of decreased coronary artery perfusion - ex. Angina  - Evaluate fluid balance, assess for edema, trend weights  Outcome: Progressing  Goal: Absence of cardiac arrhythmias or at baseline  Description: INTERVENTIONS:  - Continuous cardiac monitoring, monitor vital signs, obtain 12 lead EKG if indicated  - Evaluate effectiveness of antiarrhythmic and heart rate control medications as ordered  - Initiate emergency  measures for life threatening arrhythmias  - Monitor electrolytes and administer replacement therapy as ordered  Outcome: Progressing     Problem: RESPIRATORY - ADULT  Goal: Achieves optimal ventilation and oxygenation  Description: INTERVENTIONS:  - Assess for changes in respiratory status  - Assess for changes in mentation and behavior  - Position to facilitate oxygenation and minimize respiratory effort  - Oxygen supplementation based on oxygen saturation or ABGs  - Provide Smoking Cessation handout, if applicable  - Encourage broncho-pulmonary hygiene including cough, deep breathe, Incentive Spirometry  - Assess the need for suctioning and perform as needed  - Assess and instruct to report SOB or any respiratory difficulty  - Respiratory Therapy support as indicated  - Manage/alleviate anxiety  - Monitor for signs/symptoms of CO2 retention  Outcome: Progressing

## 2025-02-21 NOTE — PROGRESS NOTES
Progress Note  Veronika Nash Patient Status:  Outpatient in a Bed    1960 MRN [id number]   [facility] 2NE-A Attending Keshia Sawyer MD   Hosp Day # 0 PCP Tee Garrido MD     Subjective:  Denies chest pain, palpitations, or dyspnea. Notes dry cough and mild nasal congestion chronic for her otherwise denies productive cough or new symptoms.    Objective:  Physical Exam:   /81 (BP Location: Left arm)   Pulse 72   Temp 98.1 °F (36.7 °C) (Oral)   Resp 13   Ht 5' 7\" (1.702 m)   Wt 227 lb 1.2 oz (103 kg)   SpO2 94%   BMI 35.56 kg/m²   Temp (24hrs), Av.9 °F (36.6 °C), Min:97.2 °F (36.2 °C), Max:98.7 °F (37.1 °C)       Intake/Output Summary (Last 24 hours) at 2025 0917  Last data filed at 2025 0832  Gross per 24 hour   Intake 930 ml   Output 0 ml   Net 930 ml     Wt Readings from Last 3 Encounters:   25 227 lb 1.2 oz (103 kg)   25 223 lb (101.2 kg)   24 229 lb (103.9 kg)     Telemetry: NSR  General: Alert and oriented in no apparent distress seated comfortably in her bedside chair on room air at 96%. Intermittent dry cough.  HEENT: No focal deficits.  Neck: No JVD, carotids 2+ no bruits.  Cardiac: Regular rate and rhythm, S1, S2 normal, no murmur, rub or gallop.  Lungs: Clear without wheezes, rales, rhonchi or dullness.  Normal excursions and effort.  Abdomen: Soft, non-tender.   Extremities: Without clubbing, cyanosis or edema.  Peripheral pulses are 2+.  Neurologic: Alert and oriented, normal affect.  Skin: Warm and dry.        Intake/Output:    Intake/Output Summary (Last 24 hours) at 2025 0916  Last data filed at 2025 0832  Gross per 24 hour   Intake 930 ml   Output 0 ml   Net 930 ml       Last 3 Weights   02/20/25 1955 227 lb 1.2 oz (103 kg)   25 1021 220 lb (99.8 kg)   25 0913 223 lb (101.2 kg)   24 0837 229 lb (103.9 kg)       Labs:  No results for input(s): \"GLU\", \"BUN\", \"CREATSERUM\", \"GFRAA\", \"GFRNAA\", \"EGFRCR\",  \"CA\", \"NA\", \"K\", \"CL\", \"CO2\" in the last 168 hours.  No results for input(s): \"RBC\", \"HGB\", \"HCT\", \"MCV\", \"MCH\", \"MCHC\", \"RDW\", \"NEPRELIM\", \"WBC\", \"PLT\" in the last 168 hours.      No results for input(s): \"TROP\", \"TROPHS\", \"CK\" in the last 168 hours.    Diagnostics:     8/8/2024 - Nuc PET- Normal perfusion study no perfusion defects noted accounting for breast attenuation artifact. Low risk for future CV events.    ECHOCARDIOGRAM 1/19/2025: LVEF 55%, mild concentric LVH, mod LA dilation. Mild MR.      Medications:   atorvastatin  10 mg Oral Daily    busPIRone  5 mg Oral BID    dofetilide  125 mcg Oral Q12H    doxycycline  100 mg Oral BID    fenofibrate micronized  134 mg Oral Daily with breakfast    gabapentin  300 mg Oral BID    losartan  25 mg Oral Daily    montelukast  10 mg Oral Daily    pantoprazole  40 mg Oral QAM AC    sertraline  25 mg Oral Daily    rivaroxaban  20 mg Oral QAM    zolpidem  10 mg Oral Nightly         Assessment/Plan:      Paroxysmal afib s/p ablation 2/20  Currently NSR  On Tikosyn 125mcg BID  Xarelto 20 mg daily  CHADs-VASC 3 (age, HTN, gender)  Post operative hypoxia w/ dry cough for 2-3 days  Room air oxygenation well 96%, afebrile with normal exam  Lasix 20 mg IV x 1  CXR noted w/ potential infiltrate LLL  Follow cbc mild reactive leukocytosis not unexpected, and viral PCR panel  Chronic back pain  OK to resume PRN pain medication  No focal deficits or back pain \"red flags\"  Ambulate  POLO  Advise CPAP  HTN  Borderline on losartan 25 mg daily  HLD  Atorvastatin 10 mg daily      PLAN  IV lasix 20 mg x 1  ECG this AM  Follow labs and PCR, but clinically no fever or hypoxia this AM and highly doubt pneumonia  OK to resume home PRN medication  Continue Tikosyn - avoid QT prolonging medication  Continue outpatient cardiac regime  Continue outpatient PRN pain medication for lumbar discomfort which is chronic  If labs are without concerns and ambulating well on room air reasonable to discharge  this afternoon  Ambulate and monitor oxygen status        Hi Cope PA-C  2/21/2025  9:16 AM     ADDENDUM: Ambulated at 94% oxygen saturation without acute symptoms and off oxygen after IV lasix 20 mg daily. No fevers and only mild reactive leukocytosis on CBCd. EKG with normal Qtc and no acute concerns. Correct potassium for mild hypokalemia and follow BMP in 5 days. Follow up in clinic as scheduled. ED precautions as reviewed.

## 2025-02-21 NOTE — PLAN OF CARE
Patient received alert and oriented x 4. Up independently in the room. On 1L via NC, pt reports using CPAP when sleeping. NSR on tele. Continent of bowel and bladder. Reports of chronic back pain, prn pain medication given. Right groin w/ dressing C/D/I, soft and no hematoma. No complaints of shortness of breath or chest pain/discomfort. POC : Monitor O2 needs. Fall precautions in place. Call light within reach.    0600: Weaned off to RA with O2 sats >90%.    Problem: Patient/Family Goals  Goal: Patient/Family Long Term Goal  Description: Patient's Long Term Goal: Stay out of hospital    Interventions:  - Follow up with PCP after discharge  - Take medication as prescribed  - See additional Care Plan goals for specific interventions  2/21/2025 0119 by Kti Aleman RN  Outcome: Progressing  2/21/2025 0021 by Kit Aleman RN  Outcome: Progressing  Goal: Patient/Family Short Term Goal  Description: Patient's Short Term Goal: Breathe better and go home    Interventions:   - Test ordered  - Monitor O2 needs  - See additional Care Plan goals for specific interventions  2/21/2025 0119 by Kit Aleman RN  Outcome: Progressing  2/21/2025 0021 by Kit Aleman RN  Outcome: Progressing     Problem: CARDIOVASCULAR - ADULT  Goal: Maintains optimal cardiac output and hemodynamic stability  Description: INTERVENTIONS:  - Monitor vital signs, rhythm, and trends  - Monitor for bleeding, hypotension and signs of decreased cardiac output  - Evaluate effectiveness of vasoactive medications to optimize hemodynamic stability  - Monitor arterial and/or venous puncture sites for bleeding and/or hematoma  - Assess quality of pulses, skin color and temperature  - Assess for signs of decreased coronary artery perfusion - ex. Angina  - Evaluate fluid balance, assess for edema, trend weights  2/21/2025 0119 by Kit Aleman, RN  Outcome: Progressing  2/21/2025 0021 by Kit Aleman RN  Outcome: Progressing  Goal: Absence of cardiac arrhythmias or at  baseline  Description: INTERVENTIONS:  - Continuous cardiac monitoring, monitor vital signs, obtain 12 lead EKG if indicated  - Evaluate effectiveness of antiarrhythmic and heart rate control medications as ordered  - Initiate emergency measures for life threatening arrhythmias  - Monitor electrolytes and administer replacement therapy as ordered  2/21/2025 0119 by Kit Aleman, RN  Outcome: Progressing  2/21/2025 0021 by Kit Aleman, RN  Outcome: Progressing     Problem: RESPIRATORY - ADULT  Goal: Achieves optimal ventilation and oxygenation  Description: INTERVENTIONS:  - Assess for changes in respiratory status  - Assess for changes in mentation and behavior  - Position to facilitate oxygenation and minimize respiratory effort  - Oxygen supplementation based on oxygen saturation or ABGs  - Provide Smoking Cessation handout, if applicable  - Encourage broncho-pulmonary hygiene including cough, deep breathe, Incentive Spirometry  - Assess the need for suctioning and perform as needed  - Assess and instruct to report SOB or any respiratory difficulty  - Respiratory Therapy support as indicated  - Manage/alleviate anxiety  - Monitor for signs/symptoms of CO2 retention  2/21/2025 0119 by Kit Aleman, RN  Outcome: Progressing  2/21/2025 0021 by Kit Aleman RN  Outcome: Progressing

## 2025-03-02 NOTE — PROCEDURES
Procedures performed:  1. Comprehensive EP study with 3-D mapping using CARTO  2. CS catheter placement to record and pace the left atrium.   3. RFA of atrial fibrillation with pulmonary vein isolation.  4. Intra-cardiac echo (ICE)    : Keshia Sawyer MD    Pre-Op: persistent atrial fibrillation.   Post-Op: sinus rhythm    Complication: none    Methods: The patient was brought to the EP lab in a fasting state and non-sedated state. The right and left groins were prepped and draped in a sterile fashion. 3 sheaths were placed in the right femoral mario via the modified Seldinger technique. Catheters were placed in the appropriate position under ICE and 3D mapping. Baseline measurements were recorded and programmed stimulation from the atrium and ventricle was performed. At the conclusion of the procedure, catheters and sheaths were removed and hemostasis was achieved with Vascade closure devices. There were no apparent intra-operative complications no pericardial effusion visualized by ICE. An attune cooling balloon catheter was placed in the esophagus.The patient was transported in stable condition to recovery.        Mapping and ablation: A heparin bolus was given, intermittent boluses were subsequently given to maintain an ACT of at least 300 seconds. There was no effusion at baseline. The KAREEM was visualized with ICE, there did not appear to be a thrombus in the KAREEM. Transseptal (TS) access was achieved with ICE guidance.     Using the CARTO mapping system and ICE, a multi-electrode catheter was used to create a 3D geometry of the LA. Using an open irrigation RFA catheter, RFA was performed using high power short duration to achieve pulmonary vein isolation (PVI). PVI was confirmed with differential pacing.     Post ablation findings:    ms,  ms, QRS 98 ms,  ms.     AH 72 ms, HV 45 ms.          CONCLUSIONS:  1. Sinus node function normal  2. AV node function normal.  3. His [place]  normal  4. Status post successful pulmonary vein isolation.   5. No pericardial effusion visualized by ICE.    Keshia Sawyer MD  [facility]  Cardiac Electrophysiolgy  [phone]

## 2025-03-12 ENCOUNTER — MED REC SCAN ONLY (OUTPATIENT)
Facility: CLINIC | Age: 65
End: 2025-03-12

## 2025-05-15 ENCOUNTER — HOSPITAL ENCOUNTER (OUTPATIENT)
Dept: INTERVENTIONAL RADIOLOGY/VASCULAR | Facility: HOSPITAL | Age: 65
Discharge: HOME | End: 2025-05-15
Attending: INTERNAL MEDICINE
Payer: MEDICARE

## 2025-08-05 ENCOUNTER — TELEPHONE (OUTPATIENT)
Facility: CLINIC | Age: 65
End: 2025-08-05

## 2025-08-14 ENCOUNTER — MED REC SCAN ONLY (OUTPATIENT)
Facility: CLINIC | Age: 65
End: 2025-08-14

## 2025-08-15 ENCOUNTER — ORDER TRANSCRIPTION (OUTPATIENT)
Dept: PHYSICAL THERAPY | Facility: HOSPITAL | Age: 65
End: 2025-08-15

## 2025-08-15 DIAGNOSIS — M06.4 INFLAMMATORY POLYARTHROPATHY (HCC): Primary | ICD-10-CM

## (undated) DIAGNOSIS — R00.2 PALPITATIONS: Primary | ICD-10-CM

## (undated) DIAGNOSIS — I48.91 A-FIB (HCC): ICD-10-CM

## (undated) NOTE — MR AVS SNAPSHOT
[facility]  [address] [address]  [phone]               Thank you for choosing us for your health care visit with Betty Williamson PT. We are glad to serve you and happy to provide you with this summary of your visit. atenolol 50 MG Tabs   Take 50 mg by mouth 2 (two) times daily. Commonly known as:  TENORMIN           DULCOLAX OR   Take by mouth.           escitalopram 20 MG Tabs   Take 20 mg by mouth daily.    Commonly known as:  LEXAPRO           fenofibrate microni

## (undated) NOTE — MR AVS SNAPSHOT
[facility]duff[facility]  [address] [id number]  [phone]               Thank you for choosing us for your health care visit with Betty Williamson PT. We are glad to serve you and happy to provide you with this summary of your visit. Commonly known as:  LEXAPRO           fenofibrate micronized 134 MG Caps   Take 134 mg by mouth daily with breakfast.   Commonly known as:  LOFIBRA           HYDROcodone-acetaminophen  MG Tabs   Take 1 tablet by mouth every 4 (four) hours as needed f

## (undated) NOTE — MR AVS SNAPSHOT
[facility]du[facility]  [address] [address]  [phone]               Thank you for choosing us for your health care visit with Betty Williamson PT. We are glad to serve you and happy to provide you with this summary of your visit. atenolol 50 MG Tabs   Take 50 mg by mouth 2 (two) times daily. Commonly known as:  TENORMIN           DULCOLAX OR   Take by mouth.           escitalopram 20 MG Tabs   Take 20 mg by mouth daily.    Commonly known as:  LEXAPRO           fenofibrate microni

## (undated) NOTE — MR AVS SNAPSHOT
[facility]duffie  [address] [address]  [phone]               Thank you for choosing us for your health care visit with Betty Williamson PT. We are glad to serve you and happy to provide you with this summary of your visit. Take 50 mg by mouth 2 (two) times daily. Commonly known as:  TENORMIN           DULCOLAX OR   Take by mouth.           escitalopram 20 MG Tabs   Take 20 mg by mouth daily.    Commonly known as:  LEXAPRO           fenofibrate micronized 134 MG Caps   Take

## (undated) NOTE — MR AVS SNAPSHOT
[facility] Mcduffie  [address] [address]  [phone]               Thank you for choosing us for your health care visit with Betty Williamson PT. We are glad to serve you and happy to provide you with this summary of your visit. atenolol 50 MG Tabs   Take 50 mg by mouth 2 (two) times daily. Commonly known as:  TENORMIN           DULCOLAX OR   Take by mouth.           escitalopram 20 MG Tabs   Take 20 mg by mouth daily.    Commonly known as:  LEXAPRO           fenofibrate microni